# Patient Record
Sex: FEMALE | Race: WHITE | NOT HISPANIC OR LATINO | URBAN - METROPOLITAN AREA
[De-identification: names, ages, dates, MRNs, and addresses within clinical notes are randomized per-mention and may not be internally consistent; named-entity substitution may affect disease eponyms.]

---

## 2022-06-21 ENCOUNTER — INPATIENT (INPATIENT)
Facility: HOSPITAL | Age: 67
LOS: 2 days | Discharge: LEFT BEFORE DISCHARGE | DRG: 392 | End: 2022-06-24
Attending: INTERNAL MEDICINE | Admitting: INTERNAL MEDICINE
Payer: MEDICARE

## 2022-06-21 VITALS
TEMPERATURE: 98 F | WEIGHT: 134.92 LBS | RESPIRATION RATE: 20 BRPM | HEART RATE: 104 BPM | DIASTOLIC BLOOD PRESSURE: 72 MMHG | OXYGEN SATURATION: 98 % | HEIGHT: 63 IN | SYSTOLIC BLOOD PRESSURE: 156 MMHG

## 2022-06-21 DIAGNOSIS — Z29.9 ENCOUNTER FOR PROPHYLACTIC MEASURES, UNSPECIFIED: ICD-10-CM

## 2022-06-21 DIAGNOSIS — E83.42 HYPOMAGNESEMIA: ICD-10-CM

## 2022-06-21 DIAGNOSIS — K52.9 NONINFECTIVE GASTROENTERITIS AND COLITIS, UNSPECIFIED: ICD-10-CM

## 2022-06-21 DIAGNOSIS — E87.1 HYPO-OSMOLALITY AND HYPONATREMIA: ICD-10-CM

## 2022-06-21 DIAGNOSIS — I10 ESSENTIAL (PRIMARY) HYPERTENSION: ICD-10-CM

## 2022-06-21 DIAGNOSIS — N17.9 ACUTE KIDNEY FAILURE, UNSPECIFIED: ICD-10-CM

## 2022-06-21 DIAGNOSIS — Z98.890 OTHER SPECIFIED POSTPROCEDURAL STATES: Chronic | ICD-10-CM

## 2022-06-21 DIAGNOSIS — E87.6 HYPOKALEMIA: ICD-10-CM

## 2022-06-21 DIAGNOSIS — E78.5 HYPERLIPIDEMIA, UNSPECIFIED: ICD-10-CM

## 2022-06-21 LAB
ALBUMIN SERPL ELPH-MCNC: 2.7 G/DL — LOW (ref 3.3–5)
ALP SERPL-CCNC: 82 U/L — SIGNIFICANT CHANGE UP (ref 30–120)
ALT FLD-CCNC: 21 U/L DA — SIGNIFICANT CHANGE UP (ref 10–60)
ANION GAP SERPL CALC-SCNC: 10 MMOL/L — SIGNIFICANT CHANGE UP (ref 5–17)
ANION GAP SERPL CALC-SCNC: 9 MMOL/L — SIGNIFICANT CHANGE UP (ref 5–17)
APTT BLD: 24.7 SEC — LOW (ref 27.5–35.5)
AST SERPL-CCNC: 26 U/L — SIGNIFICANT CHANGE UP (ref 10–40)
BASOPHILS # BLD AUTO: 0.03 K/UL — SIGNIFICANT CHANGE UP (ref 0–0.2)
BASOPHILS NFR BLD AUTO: 0.3 % — SIGNIFICANT CHANGE UP (ref 0–2)
BILIRUB SERPL-MCNC: 0.5 MG/DL — SIGNIFICANT CHANGE UP (ref 0.2–1.2)
BUN SERPL-MCNC: 15 MG/DL — SIGNIFICANT CHANGE UP (ref 7–23)
BUN SERPL-MCNC: 17 MG/DL — SIGNIFICANT CHANGE UP (ref 7–23)
CALCIUM SERPL-MCNC: 8.2 MG/DL — LOW (ref 8.4–10.5)
CALCIUM SERPL-MCNC: 9 MG/DL — SIGNIFICANT CHANGE UP (ref 8.4–10.5)
CHLORIDE SERPL-SCNC: 86 MMOL/L — LOW (ref 96–108)
CHLORIDE SERPL-SCNC: 91 MMOL/L — LOW (ref 96–108)
CO2 SERPL-SCNC: 29 MMOL/L — SIGNIFICANT CHANGE UP (ref 22–31)
CO2 SERPL-SCNC: 32 MMOL/L — HIGH (ref 22–31)
CREAT SERPL-MCNC: 1.35 MG/DL — HIGH (ref 0.5–1.3)
CREAT SERPL-MCNC: 1.56 MG/DL — HIGH (ref 0.5–1.3)
EGFR: 37 ML/MIN/1.73M2 — LOW
EGFR: 44 ML/MIN/1.73M2 — LOW
EOSINOPHIL # BLD AUTO: 0.03 K/UL — SIGNIFICANT CHANGE UP (ref 0–0.5)
EOSINOPHIL NFR BLD AUTO: 0.3 % — SIGNIFICANT CHANGE UP (ref 0–6)
ERYTHROCYTE [SEDIMENTATION RATE] IN BLOOD: 107 MM/HR — HIGH (ref 0–20)
GLUCOSE SERPL-MCNC: 124 MG/DL — HIGH (ref 70–99)
GLUCOSE SERPL-MCNC: 162 MG/DL — HIGH (ref 70–99)
HCT VFR BLD CALC: 31.7 % — LOW (ref 34.5–45)
HGB BLD-MCNC: 10.7 G/DL — LOW (ref 11.5–15.5)
IMM GRANULOCYTES NFR BLD AUTO: 0.8 % — SIGNIFICANT CHANGE UP (ref 0–1.5)
INR BLD: 1.18 RATIO — HIGH (ref 0.88–1.16)
LYMPHOCYTES # BLD AUTO: 0.73 K/UL — LOW (ref 1–3.3)
LYMPHOCYTES # BLD AUTO: 8.1 % — LOW (ref 13–44)
MAGNESIUM SERPL-MCNC: 1.5 MG/DL — LOW (ref 1.6–2.6)
MCHC RBC-ENTMCNC: 29.1 PG — SIGNIFICANT CHANGE UP (ref 27–34)
MCHC RBC-ENTMCNC: 33.8 GM/DL — SIGNIFICANT CHANGE UP (ref 32–36)
MCV RBC AUTO: 86.1 FL — SIGNIFICANT CHANGE UP (ref 80–100)
MONOCYTES # BLD AUTO: 0.81 K/UL — SIGNIFICANT CHANGE UP (ref 0–0.9)
MONOCYTES NFR BLD AUTO: 9 % — SIGNIFICANT CHANGE UP (ref 2–14)
NEUTROPHILS # BLD AUTO: 7.38 K/UL — SIGNIFICANT CHANGE UP (ref 1.8–7.4)
NEUTROPHILS NFR BLD AUTO: 81.5 % — HIGH (ref 43–77)
NRBC # BLD: 0 /100 WBCS — SIGNIFICANT CHANGE UP (ref 0–0)
PHOSPHATE SERPL-MCNC: 2.8 MG/DL — SIGNIFICANT CHANGE UP (ref 2.5–4.5)
PLATELET # BLD AUTO: 409 K/UL — HIGH (ref 150–400)
POTASSIUM SERPL-MCNC: 2.5 MMOL/L — CRITICAL LOW (ref 3.5–5.3)
POTASSIUM SERPL-MCNC: 3 MMOL/L — LOW (ref 3.5–5.3)
POTASSIUM SERPL-SCNC: 2.5 MMOL/L — CRITICAL LOW (ref 3.5–5.3)
POTASSIUM SERPL-SCNC: 3 MMOL/L — LOW (ref 3.5–5.3)
PROT SERPL-MCNC: 7.7 G/DL — SIGNIFICANT CHANGE UP (ref 6–8.3)
PROTHROM AB SERPL-ACNC: 13.6 SEC — HIGH (ref 10.5–13.4)
RBC # BLD: 3.68 M/UL — LOW (ref 3.8–5.2)
RBC # FLD: 12.1 % — SIGNIFICANT CHANGE UP (ref 10.3–14.5)
SARS-COV-2 RNA SPEC QL NAA+PROBE: SIGNIFICANT CHANGE UP
SODIUM SERPL-SCNC: 127 MMOL/L — LOW (ref 135–145)
SODIUM SERPL-SCNC: 130 MMOL/L — LOW (ref 135–145)
WBC # BLD: 9.05 K/UL — SIGNIFICANT CHANGE UP (ref 3.8–10.5)
WBC # FLD AUTO: 9.05 K/UL — SIGNIFICANT CHANGE UP (ref 3.8–10.5)

## 2022-06-21 PROCEDURE — 99285 EMERGENCY DEPT VISIT HI MDM: CPT | Mod: FS

## 2022-06-21 PROCEDURE — 74176 CT ABD & PELVIS W/O CONTRAST: CPT | Mod: 26,MA

## 2022-06-21 PROCEDURE — 99223 1ST HOSP IP/OBS HIGH 75: CPT

## 2022-06-21 PROCEDURE — 93010 ELECTROCARDIOGRAM REPORT: CPT

## 2022-06-21 RX ORDER — ACETAMINOPHEN 500 MG
650 TABLET ORAL EVERY 6 HOURS
Refills: 0 | Status: DISCONTINUED | OUTPATIENT
Start: 2022-06-21 | End: 2022-06-24

## 2022-06-21 RX ORDER — SALIVA SUBSTITUTE COMB NO.11 351 MG
5 POWDER IN PACKET (EA) MUCOUS MEMBRANE
Refills: 0 | Status: DISCONTINUED | OUTPATIENT
Start: 2022-06-21 | End: 2022-06-24

## 2022-06-21 RX ORDER — HEPARIN SODIUM 5000 [USP'U]/ML
5000 INJECTION INTRAVENOUS; SUBCUTANEOUS EVERY 8 HOURS
Refills: 0 | Status: DISCONTINUED | OUTPATIENT
Start: 2022-06-21 | End: 2022-06-22

## 2022-06-21 RX ORDER — ONDANSETRON 8 MG/1
4 TABLET, FILM COATED ORAL EVERY 4 HOURS
Refills: 0 | Status: DISCONTINUED | OUTPATIENT
Start: 2022-06-21 | End: 2022-06-24

## 2022-06-21 RX ORDER — PIPERACILLIN AND TAZOBACTAM 4; .5 G/20ML; G/20ML
3.38 INJECTION, POWDER, LYOPHILIZED, FOR SOLUTION INTRAVENOUS ONCE
Refills: 0 | Status: COMPLETED | OUTPATIENT
Start: 2022-06-21 | End: 2022-06-21

## 2022-06-21 RX ORDER — SODIUM CHLORIDE 9 MG/ML
1000 INJECTION INTRAMUSCULAR; INTRAVENOUS; SUBCUTANEOUS ONCE
Refills: 0 | Status: COMPLETED | OUTPATIENT
Start: 2022-06-21 | End: 2022-06-21

## 2022-06-21 RX ORDER — DEXTROSE MONOHYDRATE, SODIUM CHLORIDE, AND POTASSIUM CHLORIDE 50; .745; 4.5 G/1000ML; G/1000ML; G/1000ML
1000 INJECTION, SOLUTION INTRAVENOUS
Refills: 0 | Status: DISCONTINUED | OUTPATIENT
Start: 2022-06-21 | End: 2022-06-24

## 2022-06-21 RX ORDER — PIPERACILLIN AND TAZOBACTAM 4; .5 G/20ML; G/20ML
3.38 INJECTION, POWDER, LYOPHILIZED, FOR SOLUTION INTRAVENOUS EVERY 8 HOURS
Refills: 0 | Status: DISCONTINUED | OUTPATIENT
Start: 2022-06-22 | End: 2022-06-24

## 2022-06-21 RX ORDER — POTASSIUM CHLORIDE 20 MEQ
10 PACKET (EA) ORAL
Refills: 0 | Status: COMPLETED | OUTPATIENT
Start: 2022-06-21 | End: 2022-06-21

## 2022-06-21 RX ORDER — OXYCODONE HYDROCHLORIDE 5 MG/1
5 TABLET ORAL EVERY 4 HOURS
Refills: 0 | Status: DISCONTINUED | OUTPATIENT
Start: 2022-06-21 | End: 2022-06-24

## 2022-06-21 RX ORDER — SODIUM CHLORIDE 9 MG/ML
1500 INJECTION, SOLUTION INTRAVENOUS ONCE
Refills: 0 | Status: COMPLETED | OUTPATIENT
Start: 2022-06-21 | End: 2022-06-21

## 2022-06-21 RX ORDER — MAGNESIUM SULFATE 500 MG/ML
2 VIAL (ML) INJECTION ONCE
Refills: 0 | Status: COMPLETED | OUTPATIENT
Start: 2022-06-21 | End: 2022-06-21

## 2022-06-21 RX ADMIN — Medication 5 MILLILITER(S): at 19:34

## 2022-06-21 RX ADMIN — PIPERACILLIN AND TAZOBACTAM 200 GRAM(S): 4; .5 INJECTION, POWDER, LYOPHILIZED, FOR SOLUTION INTRAVENOUS at 19:34

## 2022-06-21 RX ADMIN — Medication 100 MILLIEQUIVALENT(S): at 19:33

## 2022-06-21 RX ADMIN — Medication 5 MILLILITER(S): at 23:35

## 2022-06-21 RX ADMIN — Medication 100 MILLIEQUIVALENT(S): at 20:29

## 2022-06-21 RX ADMIN — SODIUM CHLORIDE 1000 MILLILITER(S): 9 INJECTION, SOLUTION INTRAVENOUS at 23:35

## 2022-06-21 RX ADMIN — SODIUM CHLORIDE 1000 MILLILITER(S): 9 INJECTION INTRAMUSCULAR; INTRAVENOUS; SUBCUTANEOUS at 19:34

## 2022-06-21 RX ADMIN — DEXTROSE MONOHYDRATE, SODIUM CHLORIDE, AND POTASSIUM CHLORIDE 75 MILLILITER(S): 50; .745; 4.5 INJECTION, SOLUTION INTRAVENOUS at 21:40

## 2022-06-21 RX ADMIN — Medication 100 MILLIEQUIVALENT(S): at 21:43

## 2022-06-21 RX ADMIN — Medication 25 GRAM(S): at 19:33

## 2022-06-21 NOTE — PATIENT PROFILE ADULT - FALL HARM RISK - UNIVERSAL INTERVENTIONS
Bed in lowest position, wheels locked, appropriate side rails in place/Call bell, personal items and telephone in reach/Instruct patient to call for assistance before getting out of bed or chair/Non-slip footwear when patient is out of bed/[place] to call system/Physically safe environment - no spills, clutter or unnecessary equipment/Purposeful Proactive Rounding/Room/bathroom lighting operational, light cord in reach

## 2022-06-21 NOTE — PATIENT PROFILE ADULT - FUNCTIONAL ASSESSMENT - BASIC MOBILITY ASSESSMENT TYPE
Otc Regimen: Panoxyl 4% creamy wash Detail Level: Simple Plan: Pt does not want any further treatment at this time as is desiring pregnancy. Admission

## 2022-06-21 NOTE — H&P ADULT - PROBLEM SELECTOR PLAN 4
moderate, asymptomatic, slow correction to avoid ODS, not to exceed 6-8 meq/L per 24h, closely monitor sodium level.

## 2022-06-21 NOTE — H&P ADULT - NSHPREVIEWOFSYSTEMS_GEN_ALL_CORE
- -    CONSTITUTIONAL: Low grade fever at home, no chills.  EYES: No eye pain, visual disturbances, or discharge.  ENMT:  No difficulty hearing, vertigo, sinus or throat pain, reports painful mouth ulcers.  NECK: No pain or stiffness.	  RESPIRATORY: No cough, wheezing, or hemoptysis; No shortness of breath.  CARDIOVASCULAR: No chest pain, palpitations, or dizziness, (+) leg swelling.  GASTROINTESTINAL: (+) abdominal pain, no nausea, vomiting, or hematemesis; (+) diarrhea No melena or hematochezia.  GENITOURINARY: No dysuria, frequency, hematuria, or incontinence.  NEUROLOGICAL: No headaches, focal muscle weakness, numbness, or tremors.  SKIN: No itching, burning or new rashes.  MUSCULOSKELETAL: No joint swelling or pain.  PSYCHIATRIC: No depression, anxiety, or agitation.  HEME/LYMPH: No easy bruising, bleeding gums, or nose bleed.  ALLERGY AND IMMUNOLOGIC: No hives or eczema.

## 2022-06-21 NOTE — H&P ADULT - PROBLEM SELECTOR PLAN 7
not on any medications for that, stated she tried 2 different stains & both gave her a bad reaction including skin rash, edema, and arthralgias, not willing to try any other medications even from other groups, she understands & agreed that she have an abnormal EKG & needs to see a cardiologist, and he can also take care of her cholesterol. not on any medications for that, stated she tried 2 different statins & both gave her a bad reaction including skin rash, edema, and arthralgias, not willing to try any other medications even from other groups, she understands & agreed that she have an abnormal EKG & needs to see a cardiologist, and he can also take care of her cholesterol.

## 2022-06-21 NOTE — ED PROVIDER NOTE - CARE PLAN
1 Principal Discharge DX:	Colitis  Secondary Diagnosis:	Hyponatremia  Secondary Diagnosis:	Hypokalemia

## 2022-06-21 NOTE — H&P ADULT - NSHPPHYSICALEXAM_GEN_ALL_CORE
-    Vital Signs Last 24 Hrs  T(C): 36.8 (21 Jun 2022 15:25), Max: 36.8 (21 Jun 2022 15:25)  T(F): 98.3 (21 Jun 2022 15:25), Max: 98.3 (21 Jun 2022 15:25)  HR: 104 (21 Jun 2022 15:25) (104 - 104)  BP: 156/72 (21 Jun 2022 15:25) (156/72 - 156/72)  BP(mean): --  RR: 20 (21 Jun 2022 15:25) (20 - 20)  SpO2: 98% (21 Jun 2022 15:25) (98% - 98%)        PHYSICAL EXAM:  		  GENERAL: NAD, well-groomed, well-developed.  HEAD:  Atraumatic, Norm cephalic.  EYES: PERRLA, conjunctiva clear.  ENMT: no nasal discharge, MMM.   NECK: Supple, No JVD.  NERVOUS SYSTEM:  Alert & oriented X3, neurologically intact grossly.  CHEST/LUNG: Decreased air entry allover equally B/L, no rales, rhonchi, or wheezing.  HEART: Normal S1 & S2, grade I/VI short BENITA best heard over 1st aortic area propagated to the apex, no extra sounds.  ABDOMEN: Soft, mildly tender LLQ, no guarding, non-distended; bowel sounds present, no palpable masses or organomegaly.  EXTREMITIES:  No clubbing, cyanosis, or edema.  VASCULAR: 2+ radial, DPA / PTA pulses B/L.  SKIN: (+) B/L lower extremity healed rash specially over both legs, (+) left sided suspicious face lesion (when asked she stated it was checked already, and no malignancy).  PSYCH: Tearful while talking about her health issues. -    Vital Signs Last 24 Hrs  T(C): 36.8 (21 Jun 2022 15:25), Max: 36.8 (21 Jun 2022 15:25)  T(F): 98.3 (21 Jun 2022 15:25), Max: 98.3 (21 Jun 2022 15:25)  HR: 104 (21 Jun 2022 15:25) (104 - 104)  BP: 156/72 (21 Jun 2022 15:25) (156/72 - 156/72)  BP(mean): --  RR: 20 (21 Jun 2022 15:25) (20 - 20)  SpO2: 98% (21 Jun 2022 15:25) (98% - 98%)        PHYSICAL EXAM:  		  GENERAL: NAD, well-groomed, well-developed.  HEAD:  Atraumatic, Norm cephalic.  EYES: PERRLA, conjunctiva clear.  ENMT: no nasal discharge, MMM, (+) mouth sores.   NECK: Supple, No JVD.  NERVOUS SYSTEM:  Alert & oriented X3, neurologically intact grossly.  CHEST/LUNG: Decreased air entry allover equally B/L, no rales, rhonchi, or wheezing.  HEART: Normal S1 & S2, grade I/VI short BENITA best heard over 1st aortic area propagated to the apex, no extra sounds.  ABDOMEN: Soft, mildly tender LLQ, no guarding, non-distended; bowel sounds present, no palpable masses or organomegaly.  EXTREMITIES:  No clubbing or cyanosis, (+) B/L soft pitting mild leg edema.  VASCULAR: 2+ radial, DPA / PTA pulses B/L.  SKIN: (+) B/L lower extremity healed rash specially over both legs, (+) left sided suspicious face lesion (when asked she stated it was checked already, and no malignancy).  PSYCH: Tearful while talking about her health issues.

## 2022-06-21 NOTE — H&P ADULT - PROBLEM SELECTOR PLAN 3
ML 2ry to GI loss over the past 3 weeks, supplement & monitor potassium level, telemetry monitoring.

## 2022-06-21 NOTE — H&P ADULT - NSICDXFAMILYHX_GEN_ALL_CORE_FT
FAMILY HISTORY:  Mother  Still living? Yes, Estimated age: Age Unknown  FH: colon cancer, Age at diagnosis: Age Unknown

## 2022-06-21 NOTE — H&P ADULT - HISTORY OF PRESENT ILLNESS
This is a 68 y/o F with PMH of HTN, and Dyslipidemia, who presented with 3 weeks history of diarrhea with abdominal pain, describes the diarrhea as pure mucous, starts everyday in the morning and continue for the whole day, no blood in stools, but only on the toilet paper, pain is crampy, diffuse allover the abdomen, max at the LLQ, no associated nausea or vomiting, but reports low grade fever, seen her doctor yesterday who recommended a CT, but she preferred to come to the ED. Patient has hemorrhoids for long years since she had her 1st child, never got a screening colonoscopy despite her strong family history for colon CA (mother).

## 2022-06-21 NOTE — ED PROVIDER NOTE - CLINICAL SUMMARY MEDICAL DECISION MAKING FREE TEXT BOX
Carine OLEA for ED attending, Dr. Rodrigues: 66 y/o F w/ PMHx of HLD, arthritis presents to ED c/o diarrhea 3 weeks, worsening over past week. Pt reports having 5-6 episodes a day. Denies fever, chills, n/v. Pt also c/o mouth sores and increased foot pain and hand pain x 2 weeks. Pt reports she is being treated for high cholesterol over the last 3 months but is no longer on any medication due to adverse reactions. Pt has appointment w/ Dr. Singh GI next week. Pt reports she has never had endoscopy or colonoscopy before. Physical exam: vital signs normal, afebrile and in no distress, heart and lungs normal, abd soft bowel sounds positive vague generalized tenderness no mass or hsm no rebound, extremities ankle swelling noted L worse than R. Impression is diarrhea and arthritis ?drug reaction vs GI infection, colitis may need rheumatologic workup. Plan labs, CT abd and may need GI evaluation.

## 2022-06-21 NOTE — H&P ADULT - PROBLEM SELECTOR PLAN 8
patient is at risk for VTE given also her admission diagnosis & B/L leg edema, started her on UFH 5000 units sub Q every 8h for DVT prophylaxis.

## 2022-06-21 NOTE — ED PROVIDER NOTE - NS ED ATTENDING STATEMENT MOD
This was a shared visit with the JUSTYNA. I reviewed and verified the documentation and independently performed the documented:

## 2022-06-21 NOTE — H&P ADULT - PROBLEM SELECTOR PLAN 5
supplemented earlier with 2 gm magnesium sulfate earlier, recheck magnesium level in am, on telemetry monitoring.

## 2022-06-21 NOTE — H&P ADULT - PROBLEM SELECTOR PLAN 6
controlled, patient stated she doesn't remember her medications names, will provide her home medication list later, monitor for now.

## 2022-06-21 NOTE — H&P ADULT - PROBLEM SELECTOR PLAN 1
Possibly infectious colitis versus inflammatory, clear liquid diet, pain control, IVF hydration, started on Zosyn by ED team, will continue Q 8h, stool culture, stool PCR panel, stool for ova & parasites, GI consult with Dr. Nassar was called.

## 2022-06-21 NOTE — H&P ADULT - NSHPLABSRESULTS_GEN_ALL_CORE
-                            10.7   9.05  )-----------( 409      ( 21 Jun 2022 16:38 )             31.7              06-21      130<L>  |  91<L>  |  15  ----------------------------<  162<H>  3.0<L>   |  29  |  1.35<H>      Ca    8.2<L>      21 Jun 2022 20:18  Phos  2.8     06-21  Mg     1.5     06-21    TPro  7.7  /  Alb  2.7<L>  /  TBili  0.5  /  DBili  x   /  AST  26  /  ALT  21  /  AlkPhos  82  06-21            PT/INR - ( 21 Jun 2022 20:18 )   PT: 13.6 sec;   INR: 1.18 ratio    PTT - ( 21 Jun 2022 20:18 )  PTT:24.7 sec      COVID-19 PCR: NotDetec (21 Jun 2022 18:09)      Magnesium, Serum in AM (06.21.22 @ 16:38)   Magnesium, Serum: 1.5 mg/dL   Phosphorus Level, Serum (06.21.22 @ 16:38)   Phosphorus Level, Serum: 2.8 mg/dL       `CT ABDOMEN AND PELVIS                        PROCEDURE DATE:  06/21/2022    INTERPRETATION:  CLINICAL INFORMATION: Diarrhea and left lower quadrant   pain.  COMPARISON: None.  CONTRAST/COMPLICATIONS:  IV Contrast: NONE  Oral Contrast: NONE  IMPRESSION:  Limited noncontrast study.  Moderate to severe colitis of the left colon and sigmoid colon. Prominent   adjacent adenopathy is likely reactive in nature. No abscess or free air.   Follow to resolution with repeat CT abdomen pelvis in 10-14 days.   Consider colonoscopy once acute symptoms have resolved.  Few partially imaged clustered nodules of the right middle lobe, images   1-4 of series 3. These findings may be infectious or inflammatory.   Correlate with chest CT.  Common bile duct is enlarged for patient age, measuring approximately 1   cm. Correlate with LFTs.  Personally reviewed by me.        EKG:    As per my review shows poor baseline, SR at 90/min with 1st degree AV block, normal ND & QTc intervals, normal QRS voltage, duration, and axis (+60), with normal transition, inferior ST-T abnormality.      -

## 2022-06-21 NOTE — ED ADULT NURSE NOTE - NSIMPLEMENTINTERV_GEN_ALL_ED
Implemented All Universal Safety Interventions:  [place] to call system. Call bell, personal items and telephone within reach. Instruct patient to call for assistance. Room bathroom lighting operational. Non-slip footwear when patient is off stretcher. Physically safe environment: no spills, clutter or unnecessary equipment. Stretcher in lowest position, wheels locked, appropriate side rails in place.

## 2022-06-21 NOTE — H&P ADULT - NSHPSOCIALHISTORY_GEN_ALL_CORE
-    , former smoker, stated she was a "heavy smoker", quit 10 years ago, social ETOH, no drug abuse.

## 2022-06-21 NOTE — ED PROVIDER NOTE - OBJECTIVE STATEMENT
Pt is a 66 yo female with pmhx of HTN HLD c/o of diarrhea for 3 weeks in morning and about 6 episodes per day for past week denies any nv fever chills chest pain sob urinary symptoms. Pt also having b/l ankle pain and swelling for weeks and pcp thinks hld medication related so switched medications. Pt also c/o mouth sores and increased foot pain and hand pain x 2 weeks. Pt reports she is being treated for high cholesterol over the last 3 months but is no longer on any medication due to adverse reactions. Pt has appointment w/ Dr. Samantha GOLD next week. Pt reports she has never had endoscopy or colonoscopy before.    pcp Leslee

## 2022-06-22 DIAGNOSIS — M54.2 CERVICALGIA: ICD-10-CM

## 2022-06-22 DIAGNOSIS — R91.1 SOLITARY PULMONARY NODULE: ICD-10-CM

## 2022-06-22 LAB
ALBUMIN SERPL ELPH-MCNC: 2 G/DL — LOW (ref 3.3–5)
ALP SERPL-CCNC: 57 U/L — SIGNIFICANT CHANGE UP (ref 30–120)
ALT FLD-CCNC: 16 U/L DA — SIGNIFICANT CHANGE UP (ref 10–60)
ANA TITR SER: NEGATIVE — SIGNIFICANT CHANGE UP
ANION GAP SERPL CALC-SCNC: 8 MMOL/L — SIGNIFICANT CHANGE UP (ref 5–17)
APPEARANCE UR: CLEAR — SIGNIFICANT CHANGE UP
AST SERPL-CCNC: 18 U/L — SIGNIFICANT CHANGE UP (ref 10–40)
BACTERIA # UR AUTO: ABNORMAL
BASOPHILS # BLD AUTO: 0.04 K/UL — SIGNIFICANT CHANGE UP (ref 0–0.2)
BASOPHILS NFR BLD AUTO: 0.4 % — SIGNIFICANT CHANGE UP (ref 0–2)
BILIRUB SERPL-MCNC: 0.4 MG/DL — SIGNIFICANT CHANGE UP (ref 0.2–1.2)
BILIRUB UR-MCNC: NEGATIVE — SIGNIFICANT CHANGE UP
BUN SERPL-MCNC: 12 MG/DL — SIGNIFICANT CHANGE UP (ref 7–23)
CALCIUM SERPL-MCNC: 8.2 MG/DL — LOW (ref 8.4–10.5)
CEA SERPL-MCNC: 3.8 NG/ML — SIGNIFICANT CHANGE UP (ref 0–3.8)
CHLORIDE SERPL-SCNC: 97 MMOL/L — SIGNIFICANT CHANGE UP (ref 96–108)
CO2 SERPL-SCNC: 27 MMOL/L — SIGNIFICANT CHANGE UP (ref 22–31)
COLOR SPEC: YELLOW — SIGNIFICANT CHANGE UP
CREAT SERPL-MCNC: 1.43 MG/DL — HIGH (ref 0.5–1.3)
CRP SERPL-MCNC: 249 MG/L — HIGH
CRP SERPL-MCNC: 323 MG/L — HIGH
DIFF PNL FLD: NEGATIVE — SIGNIFICANT CHANGE UP
EGFR: 41 ML/MIN/1.73M2 — LOW
EOSINOPHIL # BLD AUTO: 0.03 K/UL — SIGNIFICANT CHANGE UP (ref 0–0.5)
EOSINOPHIL NFR BLD AUTO: 0.3 % — SIGNIFICANT CHANGE UP (ref 0–6)
EPI CELLS # UR: SIGNIFICANT CHANGE UP
GLUCOSE SERPL-MCNC: 162 MG/DL — HIGH (ref 70–99)
GLUCOSE UR QL: NEGATIVE MG/DL — SIGNIFICANT CHANGE UP
HCT VFR BLD CALC: 27.2 % — LOW (ref 34.5–45)
HCV AB S/CO SERPL IA: 0.08 S/CO — SIGNIFICANT CHANGE UP (ref 0–0.99)
HCV AB SERPL-IMP: SIGNIFICANT CHANGE UP
HGB BLD-MCNC: 8.9 G/DL — LOW (ref 11.5–15.5)
IMM GRANULOCYTES NFR BLD AUTO: 0.6 % — SIGNIFICANT CHANGE UP (ref 0–1.5)
KETONES UR-MCNC: NEGATIVE — SIGNIFICANT CHANGE UP
LEUKOCYTE ESTERASE UR-ACNC: NEGATIVE — SIGNIFICANT CHANGE UP
LYMPHOCYTES # BLD AUTO: 0.75 K/UL — LOW (ref 1–3.3)
LYMPHOCYTES # BLD AUTO: 7.7 % — LOW (ref 13–44)
MAGNESIUM SERPL-MCNC: 1.9 MG/DL — SIGNIFICANT CHANGE UP (ref 1.6–2.6)
MCHC RBC-ENTMCNC: 28.4 PG — SIGNIFICANT CHANGE UP (ref 27–34)
MCHC RBC-ENTMCNC: 32.7 GM/DL — SIGNIFICANT CHANGE UP (ref 32–36)
MCV RBC AUTO: 86.9 FL — SIGNIFICANT CHANGE UP (ref 80–100)
MONOCYTES # BLD AUTO: 1.17 K/UL — HIGH (ref 0–0.9)
MONOCYTES NFR BLD AUTO: 12 % — SIGNIFICANT CHANGE UP (ref 2–14)
NEUTROPHILS # BLD AUTO: 7.71 K/UL — HIGH (ref 1.8–7.4)
NEUTROPHILS NFR BLD AUTO: 79 % — HIGH (ref 43–77)
NITRITE UR-MCNC: NEGATIVE — SIGNIFICANT CHANGE UP
NRBC # BLD: 0 /100 WBCS — SIGNIFICANT CHANGE UP (ref 0–0)
OSMOLALITY UR: 353 MOSM/KG — SIGNIFICANT CHANGE UP (ref 50–1200)
PH UR: 6.5 — SIGNIFICANT CHANGE UP (ref 5–8)
PHOSPHATE SERPL-MCNC: 3 MG/DL — SIGNIFICANT CHANGE UP (ref 2.5–4.5)
PLATELET # BLD AUTO: 340 K/UL — SIGNIFICANT CHANGE UP (ref 150–400)
POTASSIUM SERPL-MCNC: 2.7 MMOL/L — CRITICAL LOW (ref 3.5–5.3)
POTASSIUM SERPL-SCNC: 2.7 MMOL/L — CRITICAL LOW (ref 3.5–5.3)
PROCALCITONIN SERPL-MCNC: 0.3 NG/ML — HIGH (ref 0.02–0.1)
PROT SERPL-MCNC: 5.7 G/DL — LOW (ref 6–8.3)
PROT UR-MCNC: 15 MG/DL
RBC # BLD: 3.13 M/UL — LOW (ref 3.8–5.2)
RBC # FLD: 12.4 % — SIGNIFICANT CHANGE UP (ref 10.3–14.5)
RHEUMATOID FACT SERPL-ACNC: 19 IU/ML — HIGH (ref 0–13)
SODIUM SERPL-SCNC: 132 MMOL/L — LOW (ref 135–145)
SODIUM UR-SCNC: 32 MMOL/L — SIGNIFICANT CHANGE UP
SP GR SPEC: 1.01 — SIGNIFICANT CHANGE UP (ref 1.01–1.02)
TSH SERPL-MCNC: 2.35 UIU/ML — SIGNIFICANT CHANGE UP (ref 0.27–4.2)
UROBILINOGEN FLD QL: NEGATIVE MG/DL — SIGNIFICANT CHANGE UP
WBC # BLD: 9.76 K/UL — SIGNIFICANT CHANGE UP (ref 3.8–10.5)
WBC # FLD AUTO: 9.76 K/UL — SIGNIFICANT CHANGE UP (ref 3.8–10.5)
WBC UR QL: SIGNIFICANT CHANGE UP

## 2022-06-22 PROCEDURE — 99233 SBSQ HOSP IP/OBS HIGH 50: CPT

## 2022-06-22 RX ORDER — POTASSIUM CHLORIDE 20 MEQ
10 PACKET (EA) ORAL
Refills: 0 | Status: COMPLETED | OUTPATIENT
Start: 2022-06-22 | End: 2022-06-22

## 2022-06-22 RX ORDER — MAGNESIUM SULFATE 500 MG/ML
1 VIAL (ML) INJECTION ONCE
Refills: 0 | Status: COMPLETED | OUTPATIENT
Start: 2022-06-22 | End: 2022-06-22

## 2022-06-22 RX ORDER — ROSUVASTATIN CALCIUM 5 MG/1
0 TABLET ORAL
Qty: 0 | Refills: 0 | DISCHARGE

## 2022-06-22 RX ORDER — OXYCODONE HYDROCHLORIDE 5 MG/1
15 TABLET ORAL THREE TIMES A DAY
Refills: 0 | Status: DISCONTINUED | OUTPATIENT
Start: 2022-06-22 | End: 2022-06-24

## 2022-06-22 RX ORDER — HEPARIN SODIUM 5000 [USP'U]/ML
5000 INJECTION INTRAVENOUS; SUBCUTANEOUS EVERY 12 HOURS
Refills: 0 | Status: DISCONTINUED | OUTPATIENT
Start: 2022-06-22 | End: 2022-06-24

## 2022-06-22 RX ORDER — ACETAMINOPHEN 500 MG
650 TABLET ORAL EVERY 6 HOURS
Refills: 0 | Status: DISCONTINUED | OUTPATIENT
Start: 2022-06-22 | End: 2022-06-24

## 2022-06-22 RX ADMIN — Medication 650 MILLIGRAM(S): at 02:25

## 2022-06-22 RX ADMIN — Medication 100 MILLIEQUIVALENT(S): at 10:45

## 2022-06-22 RX ADMIN — Medication 5 MILLILITER(S): at 18:03

## 2022-06-22 RX ADMIN — HEPARIN SODIUM 5000 UNIT(S): 5000 INJECTION INTRAVENOUS; SUBCUTANEOUS at 18:02

## 2022-06-22 RX ADMIN — DEXTROSE MONOHYDRATE, SODIUM CHLORIDE, AND POTASSIUM CHLORIDE 75 MILLILITER(S): 50; .745; 4.5 INJECTION, SOLUTION INTRAVENOUS at 02:19

## 2022-06-22 RX ADMIN — Medication 100 MILLIEQUIVALENT(S): at 09:56

## 2022-06-22 RX ADMIN — Medication 100 MILLIEQUIVALENT(S): at 09:01

## 2022-06-22 RX ADMIN — DEXTROSE MONOHYDRATE, SODIUM CHLORIDE, AND POTASSIUM CHLORIDE 75 MILLILITER(S): 50; .745; 4.5 INJECTION, SOLUTION INTRAVENOUS at 12:50

## 2022-06-22 RX ADMIN — Medication 100 GRAM(S): at 10:45

## 2022-06-22 RX ADMIN — Medication 5 MILLILITER(S): at 05:49

## 2022-06-22 RX ADMIN — PIPERACILLIN AND TAZOBACTAM 25 GRAM(S): 4; .5 INJECTION, POWDER, LYOPHILIZED, FOR SOLUTION INTRAVENOUS at 18:03

## 2022-06-22 RX ADMIN — HEPARIN SODIUM 5000 UNIT(S): 5000 INJECTION INTRAVENOUS; SUBCUTANEOUS at 05:55

## 2022-06-22 RX ADMIN — Medication 5 MILLILITER(S): at 11:29

## 2022-06-22 RX ADMIN — PIPERACILLIN AND TAZOBACTAM 25 GRAM(S): 4; .5 INJECTION, POWDER, LYOPHILIZED, FOR SOLUTION INTRAVENOUS at 03:55

## 2022-06-22 NOTE — CONSULT NOTE ADULT - SUBJECTIVE AND OBJECTIVE BOX
Date/Time Patient Seen:  		  Referring MD:   Data Reviewed	       Patient is a 67y old  Female who presents with a chief complaint of Abdominal pain & diarrhea. (21 Jun 2022 21:17)      Subjective/HPI  in bed  seen and examined  vs noted  labs reviewed  imaging reviewed  alert  verbal  oriented  social drinker  non smoker  lives at home    has 2 children  retired  ct abd noted  H and P reviewed  ER provider note reviewed    History of Present Illness:  Reason for Admission: Abdominal pain & diarrhea.  History of Present Illness:   This is a 66 y/o F with PMH of HTN, and Dyslipidemia, who presented with 3 weeks history of diarrhea with abdominal pain, describes the diarrhea as pure mucous, starts everyday in the morning and continue for the whole day, no blood in stools, but only on the toilet paper, pain is crampy, diffuse allover the abdomen, max at the LLQ, no associated nausea or vomiting, but reports low grade fever, seen her doctor yesterday who recommended a CT, but she preferred to come to the ED. Patient has hemorrhoids for long years since she had her 1st child, never got a screening colonoscopy despite her strong family history for colon CA (mother).       FAMILY HISTORY:  Mother  Still living? Yes, Estimated age: Age Unknown  FH: colon cancer, Age at diagnosis: Age Unknown.     Social History:  Social History (marital status, living situation, occupation, tobacco use, alcohol and drug use, and sexual history): -    , former smoker, stated she was a "heavy smoker", quit 10 years ago, social ETOH, no drug abuse.     Tobacco Screening:  · Core Measure Site	No  · Has the patient used tobacco in the past 30 days?	No    Risk Assessment:    Present on Admission:  Deep Venous Thrombosis	no  Pulmonary Embolus	no  Urinary Catheter	no  Central Venous Catheter/PICC Line	no  Surgical Site Incision	no  Pressure Ulcer(s)	no     Heart Failure:  Does this patient have a history of or has been diagnosed with heart failure? no.     PAST MEDICAL & SURGICAL HISTORY:  HTN (hypertension)    Dyslipidemia    H/O cervical spine surgery          Medication list         MEDICATIONS  (STANDING):  Biotene Dry Mouth Oral Rinse 5 milliLiter(s) Swish and Spit four times a day  dextrose 5% + sodium chloride 0.9% with potassium chloride 20 mEq/L 1000 milliLiter(s) (75 mL/Hr) IV Continuous <Continuous>  heparin   Injectable 5000 Unit(s) SubCutaneous every 8 hours  piperacillin/tazobactam IVPB.. 3.375 Gram(s) IV Intermittent every 8 hours    MEDICATIONS  (PRN):  acetaminophen     Tablet .. 650 milliGRAM(s) Oral every 6 hours PRN Mild Pain (1 - 3)  acetaminophen     Tablet .. 650 milliGRAM(s) Oral every 6 hours PRN Temp greater or equal to 38C (100.4F)  ondansetron Injectable 4 milliGRAM(s) IV Push every 4 hours PRN Nausea and/or Vomiting  oxyCODONE    IR 5 milliGRAM(s) Oral every 4 hours PRN Moderate Pain (4 - 6)         Vitals log        ICU Vital Signs Last 24 Hrs  T(C): 36.8 (22 Jun 2022 07:42), Max: 38.7 (22 Jun 2022 01:45)  T(F): 98.3 (22 Jun 2022 07:42), Max: 101.7 (22 Jun 2022 01:45)  HR: 74 (22 Jun 2022 07:42) (74 - 104)  BP: 100/62 (22 Jun 2022 07:42) (100/62 - 156/72)  BP(mean): --  ABP: --  ABP(mean): --  RR: 16 (22 Jun 2022 07:42) (16 - 20)  SpO2: 96% (22 Jun 2022 07:42) (96% - 100%)           Input and Output:  I&O's Detail      Lab Data                        8.9    9.76  )-----------( 340      ( 22 Jun 2022 06:00 )             27.2     06-21    130<L>  |  91<L>  |  15  ----------------------------<  162<H>  3.0<L>   |  29  |  1.35<H>    Ca    8.2<L>      21 Jun 2022 20:18  Phos  2.8     06-21  Mg     1.5     06-21    TPro  7.7  /  Alb  2.7<L>  /  TBili  0.5  /  DBili  x   /  AST  26  /  ALT  21  /  AlkPhos  82  06-21            Review of Systems	  pain    Objective     Physical Examination    heart s1s2  lung dec BS  abd soft  head nc  verbal  alert  cn grossly int  moves all extr      Pertinent Lab findings & Imaging      Narayan:  NO   Adequate UO     I&O's Detail           Discussed with:     Cultures:	        Radiology      ACC: [id number] EXAM:  CT ABDOMEN AND PELVIS                          PROCEDURE DATE:  06/21/2022          INTERPRETATION:  CLINICAL INFORMATION: Diarrhea and left lower quadrant   pain.    COMPARISON: None.    CONTRAST/COMPLICATIONS:  IV Contrast: NONE  Oral Contrast: NONE  Complications: None reported at time of study completion    PROCEDURE:  CT of the Abdomen and Pelvis was performed.  Sagittal and coronal reformats were performed.    FINDINGS:    LOWER CHEST: Few partially imaged clustered nodules of the right middle   lobe, images 1-4 of series 3. These findings may be infectious or   inflammatory. Correlate with chest CT. No visualized pleural effusion.    Solid organ evaluation limited due to noncontrast technique.    LIVER: Measures 17.5 cm craniocaudal dimension  BILE DUCTS: No intrahepatic ductal dilatation. Common bile duct is   enlarged for patient age, measuring approximately 1 cm. Correlate with   LFTs.  GALLBLADDER: Contracted  SPLEEN: Spleen size within normal limits  PANCREAS: No acute peripancreatic inflammation  ADRENALS: Unremarkable  KIDNEYS/URETERS: No hydronephrosis    BLADDER: Underdistended  REPRODUCTIVE ORGANS: Myomatous uterus, suboptimally characterized on CT.    BOWEL: Stomach is underdistended. No small bowel distention. The appendix   is nondistended and noninflamed. There is long segment moderate to severe   mural thickening of the left colon and sigmoid colon concerning for   colitis. Adjacent reactive adenopathy is noted. Colonic diverticulosis is   also noted without clear evidence of focal diverticulitis.  PERITONEUM: Trace fluid. No loculated fluid collection or free air.  VESSELS: No aneurysm of the abdominal aorta. Aortoiliac atheromatous   changes.  RETROPERITONEUM/LYMPH NODES: Prominent adenopathy along the course of the   inflamed colon is likely reactive in nature.  ABDOMINAL WALL: Tiny fat-containing umbilical hernia.  BONES: Degenerative changes.    IMPRESSION:    Limited noncontrast study.    Moderate to severe colitis of the left colon and sigmoid colon. Prominent   adjacent adenopathy is likely reactive in nature. No abscess or free air.   Follow to resolution with repeat CT abdomen pelvis in 10-14 days.   Consider colonoscopy once acute symptoms have resolved.    Few partially imaged clustered nodules of the right middle lobe, images   1-4 of series 3. These findings may be infectious or inflammatory.   Correlate with chest CT.    Common bile duct is enlarged for patient age, measuring approximately 1   cm. Correlate with LFTs.    --- End of Report ---            SHERON RODRIGUEZ M.D., ATTENDING RADIOLOGIST  This document has been electronically signed. Jun 21 2022  4:45PM

## 2022-06-22 NOTE — CONSULT NOTE ADULT - SUBJECTIVE AND OBJECTIVE BOX
Patient is a 67y old  Female who presents with a chief complaint of Abdominal pain & diarrhea. (2022 07:57)    HPI:  This is a 68 y/o F with PMH of HTN, and Dyslipidemia, who presented with 3 weeks history of diarrhea with abdominal pain, describes the diarrhea as pure mucous, starts everyday in the morning and continue for the whole day, no blood in stools, but only on the toilet paper, pain is crampy, diffuse allover the abdomen, max at the LLQ, no associated nausea or vomiting, but reports low grade fever, seen her doctor yesterday who recommended a CT, but she preferred to come to the ED. Patient has hemorrhoids for long years since she had her 1st child, never got a screening colonoscopy despite her strong family history for colon CA (mother).  (2022 21:17)      PAST MEDICAL HISTORY:  HTN (hypertension)    Dyslipidemia        PAST SURGICAL HISTORY:  H/O cervical spine surgery        FAMILY HISTORY:  FH: colon cancer (Mother)        SOCIAL HISTORY:    Allergies    No Known Allergies    Intolerances      Home Medications:    MEDICATIONS  (STANDING):  Biotene Dry Mouth Oral Rinse 5 milliLiter(s) Swish and Spit four times a day  dextrose 5% + sodium chloride 0.9% with potassium chloride 20 mEq/L 1000 milliLiter(s) (75 mL/Hr) IV Continuous <Continuous>  heparin   Injectable 5000 Unit(s) SubCutaneous every 8 hours  magnesium sulfate  IVPB 1 Gram(s) IV Intermittent once  piperacillin/tazobactam IVPB.. 3.375 Gram(s) IV Intermittent every 8 hours  potassium chloride  10 mEq/100 mL IVPB 10 milliEquivalent(s) IV Intermittent every 1 hour    MEDICATIONS  (PRN):  acetaminophen     Tablet .. 650 milliGRAM(s) Oral every 6 hours PRN Mild Pain (1 - 3)  acetaminophen     Tablet .. 650 milliGRAM(s) Oral every 6 hours PRN Temp greater or equal to 38C (100.4F)  ondansetron Injectable 4 milliGRAM(s) IV Push every 4 hours PRN Nausea and/or Vomiting  oxyCODONE    IR 5 milliGRAM(s) Oral every 4 hours PRN Moderate Pain (4 - 6)      REVIEW OF SYSTEMS:  General:   Respiratory: No cough, SOB  Cardiovascular: No CP or Palpitations	  Gastrointestinal: No nausea, Vomiting. No diarrhea  Genitourinary: No urinary complaints	  Musculoskeletal: No leg swelling, No new rash or lesions	  Neurological: 	  all other systems negative    T(F): 98.3 (22 @ 07:42), Max: 101.7 (22 @ 01:45)  HR: 74 (22 @ 07:42) (74 - 104)  BP: 100/62 (22 @ 07:42) (100/62 - 156/72)  RR: 16 (22 @ 07:42) (16 - 20)  SpO2: 96% (22 @ 07:42) (96% - 100%)  Wt(kg): --    PHYSICAL EXAM:  General: NAD  Respiratory: b/l air entry  Cardiovascular: S1 S2  Gastrointestinal: soft  Extremities: edema            132<L>  |  97  |  12  ----------------------------<  162<H>  2.7<LL>   |  27  |  1.43<H>    Ca    8.2<L>      2022 06:00  Phos  3.0       Mg     1.9         TPro  5.7<L>  /  Alb  2.0<L>  /  TBili  0.4  /  DBili  x   /  AST  18  /  ALT  16  /  AlkPhos  57                            8.9    9.76  )-----------( 340      ( 2022 06:00 )             27.2       Hemoglobin: 8.9 g/dL ( @ 06:00)  Hematocrit: 27.2 % ( @ 06:00)  Blood Urea Nitrogen, Serum: 12 mg/dL ( @ 06:00)  Potassium, Serum: 2.7 mmol/L ( @ 06:00)      Creatinine, Serum: 1.43 ( @ 06:00)  Creatinine, Serum: 1.35 ( @ 20:18)  Creatinine, Serum: 1.56 ( @ 16:38)      Urinalysis Basic - ( 2022 02:38 )    Color: Yellow / Appearance: Clear / S.010 / pH: x  Gluc: x / Ketone: Negative  / Bili: Negative / Urobili: Negative mg/dL   Blood: x / Protein: 15 mg/dL / Nitrite: Negative   Leuk Esterase: Negative / RBC: x / WBC 0-2   Sq Epi: x / Non Sq Epi: Occasional / Bacteria: Occasional      LIVER FUNCTIONS - ( 2022 06:00 )  Alb: 2.0 g/dL / Pro: 5.7 g/dL / ALK PHOS: 57 U/L / ALT: 16 U/L DA / AST: 18 U/L / GGT: x                       I&O's Detail         Patient is a 67y old  Female who presents with a chief complaint of Abdominal pain & diarrhea. (2022 07:57)    HPI:  This is a 66 y/o F with PMH of HTN, and Dyslipidemia, who presented with 3 weeks history of diarrhea with abdominal pain, describes the diarrhea as pure mucous, starts everyday in the morning and continue for the whole day, no blood in stools, but only on the toilet paper, pain is crampy, diffuse allover the abdomen, max at the LLQ, no associated nausea or vomiting, but reports low grade fever, seen her doctor yesterday who recommended a CT, but she preferred to come to the ED. Patient has hemorrhoids for long years since she had her 1st child, never got a screening colonoscopy despite her strong family history for colon CA (mother).  (2022 21:17)    Renal consult called for hypokalemia. History obtained from chart and patient.       PAST MEDICAL HISTORY:  HTN (hypertension)    Dyslipidemia        PAST SURGICAL HISTORY:  H/O cervical spine surgery        FAMILY HISTORY:  FH: colon cancer (Mother)        SOCIAL HISTORY: No smoking or alcohol use     Allergies    No Known Allergies    Intolerances      Home Medications:    MEDICATIONS  (STANDING):  Biotene Dry Mouth Oral Rinse 5 milliLiter(s) Swish and Spit four times a day  dextrose 5% + sodium chloride 0.9% with potassium chloride 20 mEq/L 1000 milliLiter(s) (75 mL/Hr) IV Continuous <Continuous>  heparin   Injectable 5000 Unit(s) SubCutaneous every 8 hours  magnesium sulfate  IVPB 1 Gram(s) IV Intermittent once  piperacillin/tazobactam IVPB.. 3.375 Gram(s) IV Intermittent every 8 hours  potassium chloride  10 mEq/100 mL IVPB 10 milliEquivalent(s) IV Intermittent every 1 hour    MEDICATIONS  (PRN):  acetaminophen     Tablet .. 650 milliGRAM(s) Oral every 6 hours PRN Mild Pain (1 - 3)  acetaminophen     Tablet .. 650 milliGRAM(s) Oral every 6 hours PRN Temp greater or equal to 38C (100.4F)  ondansetron Injectable 4 milliGRAM(s) IV Push every 4 hours PRN Nausea and/or Vomiting  oxyCODONE    IR 5 milliGRAM(s) Oral every 4 hours PRN Moderate Pain (4 - 6)      REVIEW OF SYSTEMS:  General: no distress  Respiratory: No cough, SOB  Cardiovascular: No CP or Palpitations	  Gastrointestinal: + diarrhea  Genitourinary: No urinary complaints	  Musculoskeletal: No leg swelling, No new rash or lesions		  all other systems negative    T(F): 98.3 (22 @ 07:42), Max: 101.7 (22 @ 01:45)  HR: 74 (22 @ 07:42) (74 - 104)  BP: 100/62 (22 @ 07:42) (100/62 - 156/72)  RR: 16 (22 @ 07:42) (16 - 20)  SpO2: 96% (22 @ 07:42) (96% - 100%)  Wt(kg): --    PHYSICAL EXAM:  General: NAD  Respiratory: b/l air entry  Cardiovascular: S1 S2  Gastrointestinal: soft  Extremities: no edema            132<L>  |  97  |  12  ----------------------------<  162<H>  2.7<LL>   |  27  |  1.43<H>    Ca    8.2<L>      2022 06:00  Phos  3.0       Mg     1.9         TPro  5.7<L>  /  Alb  2.0<L>  /  TBili  0.4  /  DBili  x   /  AST  18  /  ALT  16  /  AlkPhos  57                            8.9    9.76  )-----------( 340      ( 2022 06:00 )             27.2       Hemoglobin: 8.9 g/dL ( @ 06:00)  Hematocrit: 27.2 % ( @ 06:00)  Blood Urea Nitrogen, Serum: 12 mg/dL ( @ 06:00)  Potassium, Serum: 2.7 mmol/L ( @ 06:00)      Creatinine, Serum: 1.43 ( @ 06:00)  Creatinine, Serum: 1.35 ( @ 20:18)  Creatinine, Serum: 1.56 ( @ 16:38)      Urinalysis Basic - ( 2022 02:38 )    Color: Yellow / Appearance: Clear / S.010 / pH: x  Gluc: x / Ketone: Negative  / Bili: Negative / Urobili: Negative mg/dL   Blood: x / Protein: 15 mg/dL / Nitrite: Negative   Leuk Esterase: Negative / RBC: x / WBC 0-2   Sq Epi: x / Non Sq Epi: Occasional / Bacteria: Occasional      LIVER FUNCTIONS - ( 2022 06:00 )  Alb: 2.0 g/dL / Pro: 5.7 g/dL / ALK PHOS: 57 U/L / ALT: 16 U/L DA / AST: 18 U/L / GGT: x             < from: CT Abdomen and Pelvis No Cont (22 @ 16:25) >    ACC: [id number] EXAM:  CT ABDOMEN AND PELVIS                          PROCEDURE DATE:  2022          INTERPRETATION:  CLINICAL INFORMATION: Diarrhea and left lower quadrant   pain.    COMPARISON: None.    CONTRAST/COMPLICATIONS:  IV Contrast: NONE  Oral Contrast: NONE  Complications: None reported at time of study completion    PROCEDURE:  CT of the Abdomen and Pelvis was performed.  Sagittal and coronal reformats were performed.    FINDINGS:    LOWER CHEST: Few partially imaged clustered nodules of the right middle   lobe, images 1-4 of series 3. These findings may be infectious or   inflammatory. Correlate with chest CT. No visualized pleural effusion.    Solid organ evaluation limited due to noncontrast technique.    LIVER: Measures 17.5 cm craniocaudal dimension  BILE DUCTS: No intrahepatic ductal dilatation. Common bile duct is   enlarged for patient age, measuring approximately 1 cm. Correlate with   LFTs.  GALLBLADDER: Contracted  SPLEEN: Spleen size within normal limits  PANCREAS: No acute peripancreatic inflammation  ADRENALS: Unremarkable  KIDNEYS/URETERS: No hydronephrosis    BLADDER: Underdistended  REPRODUCTIVE ORGANS: Myomatous uterus, suboptimally characterized on CT.    BOWEL: Stomach is underdistended. No small bowel distention. The appendix   is nondistended and noninflamed. There is long segment moderate to severe   mural thickening of the left colon and sigmoid colon concerning for   colitis. Adjacent reactive adenopathy is noted. Colonic diverticulosis is   also noted without clear evidence of focal diverticulitis.  PERITONEUM: Trace fluid. No loculated fluid collection or free air.  VESSELS: No aneurysm of the abdominal aorta. Aortoiliac atheromatous   changes.  RETROPERITONEUM/LYMPH NODES: Prominent adenopathy along the course of the   inflamed colon is likely reactive in nature.  ABDOMINAL WALL: Tiny fat-containing umbilical hernia.  BONES: Degenerative changes.    IMPRESSION:    Limited noncontrast study.    Moderate to severe colitis of the left colon and sigmoid colon. Prominent   adjacent adenopathy is likely reactive in nature. No abscess or free air.   Follow to resolution with repeat CT abdomen pelvis in 10-14 days.   Consider colonoscopy once acute symptoms have resolved.    Few partially imaged clustered nodules of the right middle lobe, images   1-4 of series 3. These findings may be infectious or inflammatory.   Correlate with chest CT.    Common bile duct is enlarged for patient age, measuring approximately 1   cm. Correlate with LFTs.    --- End of Report ---            SHERON RODRIGUEZ M.D., ATTENDING RADIOLOGIST  This document has been electronically signed. 2022  4:45PM    < end of copied text >

## 2022-06-22 NOTE — CONSULT NOTE ADULT - REASON FOR ADMISSION
Abdominal pain & diarrhea.

## 2022-06-22 NOTE — CONSULT NOTE ADULT - SUBJECTIVE AND OBJECTIVE BOX
HPI:  68YO F PMH of HTN, and Dyslipidemia, who presented to the hospital with cc of 3 weeks history of diarrhea with abdominal pain no associated nausea or vomiting, but reports low grade fever. Denies recent antibiotic use, No recent travel or sick contacts. CT AP showed Moderate to severe colitis of the left colon and sigmoid colon. Febrile Tmax 101.7 WBC wnl.     Infectious Disease consult was called to evaluate pt and for antibiotic management.      Past Medical & Surgical Hx:  PAST MEDICAL & SURGICAL HISTORY:  HTN (hypertension)  Dyslipidemia  H/O cervical spine surgery    Social History--  EtOH: denies   Tobacco: denies   Drug Use: denies       FAMILY HISTORY:  FH: colon cancer (Mother)      Allergies  No Known Allergies    Intolerances  NONE      Home Medications:  OXYCODONE HCL 15MG TABS: TAKE ONE TABLET BY MOUTH THREE TIMES A DAY AS NEEDED (22 Jun 2022 11:23)      Current Inpatient Medications :    ANTIBIOTICS:   piperacillin/tazobactam IVPB.. 3.375 Gram(s) IV Intermittent every 8 hours      OTHER RELEVANT MEDICATIONS :  acetaminophen     Tablet .. 650 milliGRAM(s) Oral every 6 hours PRN  acetaminophen     Tablet .. 650 milliGRAM(s) Oral every 6 hours PRN  Biotene Dry Mouth Oral Rinse 5 milliLiter(s) Swish and Spit four times a day  dextrose 5% + sodium chloride 0.9% with potassium chloride 20 mEq/L 1000 milliLiter(s) IV Continuous <Continuous>  heparin   Injectable 5000 Unit(s) SubCutaneous every 12 hours  ondansetron Injectable 4 milliGRAM(s) IV Push every 4 hours PRN  oxyCODONE    IR 5 milliGRAM(s) Oral every 4 hours PRN  oxyCODONE    IR 15 milliGRAM(s) Oral three times a day PRN        ROS:  CONSTITUTIONAL:  +fever or chills  EYES:  Negative  blurry vision or double vision  CARDIOVASCULAR:  Negative for chest pain or palpitations  RESPIRATORY:  Negative for cough, wheezing, or SOB   GASTROINTESTINAL:  Negative for nausea, vomiting, constipation, + abdominal pain diarrhea  GENITOURINARY:  Negative frequency, urgency , dysuria or hematuria  NEUROLOGIC:  No headache, confusion, dizziness, lightheadedness  All other systems were reviewed and are negative    Physical Exam:  Vital Signs Last 24 Hrs  T(C): 36.8 (22 Jun 2022 07:42), Max: 38.7 (22 Jun 2022 01:45)  T(F): 98.3 (22 Jun 2022 07:42), Max: 101.7 (22 Jun 2022 01:45)  HR: 74 (22 Jun 2022 07:42) (74 - 104)  BP: 100/62 (22 Jun 2022 07:42) (100/62 - 156/72)  RR: 16 (22 Jun 2022 07:42) (16 - 20)  SpO2: 96% (22 Jun 2022 07:42) (96% - 100%)  Height (cm): 160 (06-21 @ 15:25)  Weight (kg): 61.2 (06-21 @ 15:25)  BMI (kg/m2): 23.9 (06-21 @ 15:25)  BSA (m2): 1.64 (06-21 @ 15:25)    General: no acute distress  Neck: supple, trachea midline  Lungs: clear, no wheeze/rhonchi  Cardiovascular: regular rate and rhythm, S1 S2  Abdomen: soft, lower abd tender, ND, bowel sounds normal  Neurological:  alert and oriented x3  Skin: no rash  Extremities: no edema    Labs:                         8.9    9.76  )-----------( 340      ( 22 Jun 2022 06:00 )             27.2   06-22    132<L>  |  97  |  12  ----------------------------<  162<H>  2.7<LL>   |  27  |  1.43<H>    Ca    8.2<L>      22 Jun 2022 06:00  Phos  3.0     06-22  Mg     1.9     06-22    TPro  5.7<L>  /  Alb  2.0<L>  /  TBili  0.4  /  DBili  x   /  AST  18  /  ALT  16  /  AlkPhos  57  06-22      RECENT CULTURES:          RADIOLOGY & ADDITIONAL STUDIES:    ACC: [id number] EXAM:  CT ABDOMEN AND PELVIS                          PROCEDURE DATE:  06/21/2022          INTERPRETATION:  CLINICAL INFORMATION: Diarrhea and left lower quadrant   pain.    COMPARISON: None.    CONTRAST/COMPLICATIONS:  IV Contrast: NONE  Oral Contrast: NONE  Complications: None reported at time of study completion    PROCEDURE:  CT of the Abdomen and Pelvis was performed.  Sagittal and coronal reformats were performed.    FINDINGS:    LOWER CHEST: Few partially imaged clustered nodules of the right middle   lobe, images 1-4 of series 3. These findings may be infectious or   inflammatory. Correlate with chest CT. No visualized pleural effusion.    Solid organ evaluation limited due to noncontrast technique.    LIVER: Measures 17.5 cm craniocaudal dimension  BILE DUCTS: No intrahepatic ductal dilatation. Common bile duct is   enlarged for patient age, measuring approximately 1 cm. Correlate with   LFTs.  GALLBLADDER: Contracted  SPLEEN: Spleen size within normal limits  PANCREAS: No acute peripancreatic inflammation  ADRENALS: Unremarkable  KIDNEYS/URETERS: No hydronephrosis    BLADDER: Underdistended  REPRODUCTIVE ORGANS: Myomatous uterus, suboptimally characterized on CT.    BOWEL: Stomach is underdistended. No small bowel distention. The appendix   is nondistended and noninflamed. There is long segment moderate to severe   mural thickening of the left colon and sigmoid colon concerning for   colitis. Adjacent reactive adenopathy is noted. Colonic diverticulosis is   also noted without clear evidence of focal diverticulitis.  PERITONEUM: Trace fluid. No loculated fluid collection or free air.  VESSELS: No aneurysm of the abdominal aorta. Aortoiliac atheromatous   changes.  RETROPERITONEUM/LYMPH NODES: Prominent adenopathy along the course of the   inflamed colon is likely reactive in nature.  ABDOMINAL WALL: Tiny fat-containing umbilical hernia.  BONES: Degenerative changes.    IMPRESSION:    Limited noncontrast study.    Moderate to severe colitis of the left colon and sigmoid colon. Prominent   adjacent adenopathy is likely reactive in nature. No abscess or free air.   Follow to resolution with repeat CT abdomen pelvis in 10-14 days.   Consider colonoscopy once acute symptoms have resolved.    Few partially imaged clustered nodules of the right middle lobe, images   1-4 of series 3. These findings may be infectious or inflammatory.   Correlate with chest CT.    Common bile duct is enlarged for patient age, measuring approximately 1   cm. Correlate with LFTs.    Assessment :   68YO F PMH of HTN, and Dyslipidemia, who presented to the hospital with cc of 3 weeks history of diarrhea with abdominal pain found to have Moderate to severe colitis of the left colon and sigmoid colon.   Febrile Tmax 101.7  rule out infectious colitis  rule out Cdiff  ? acute diverticulitis    Plan :   Cont Zosyn  Fu cultures  Trend temps and cbc  Serial abd exams  GI on case    D/w Dr Green    Continue with present regiment .  Approptiate use of antibiotics and adverse effects reviewed.      I have discussed the above plan of care with patient in detail. She expressed understanding of the treatment plan . Risks, benefits and alternatives discussed in detail. I have asked if she has any questions or concerns and appropriately addressed them to the best of my ability .      > 45 minutes spent in direct patient care reviewing  the notes, lab data/ imaging , discussion with multidisciplinary team. All questions were addressed and answered to the best of my capacity .    Thank you for allowing me to participate in the care of your patient .      Rach Cisneros MD  Infectious Disease  [phone]

## 2022-06-22 NOTE — CONSULT NOTE ADULT - SUBJECTIVE AND OBJECTIVE BOX
Chief Complaint:  Patient is a 67y old  Female who presents with a chief complaint of Abdominal pain & diarrhea. (2022 11:04)    HTN (hypertension)    Dyslipidemia    H/O cervical spine surgery       HPI:  This is a 66 y/o F with PMH of HTN, and Dyslipidemia, who presented with 3 weeks history of diarrhea with abdominal pain, describes the diarrhea as pure mucous, starts everyday in the morning and continue for the whole day, no blood in stools, but only on the toilet paper, pain is crampy, diffuse allover the abdomen, max at the LLQ, no associated nausea or vomiting, but reports low grade fever, seen her doctor yesterday who recommended a CT, but she preferred to come to the ED. Patient has hemorrhoids for long years since she had her 1st child, never got a screening colonoscopy despite her strong family history for colon CA (mother).  (2022 21:17)      No Known Allergies      acetaminophen     Tablet .. 650 milliGRAM(s) Oral every 6 hours PRN  acetaminophen     Tablet .. 650 milliGRAM(s) Oral every 6 hours PRN  Biotene Dry Mouth Oral Rinse 5 milliLiter(s) Swish and Spit four times a day  dextrose 5% + sodium chloride 0.9% with potassium chloride 20 mEq/L 1000 milliLiter(s) IV Continuous <Continuous>  heparin   Injectable 5000 Unit(s) SubCutaneous every 12 hours  ondansetron Injectable 4 milliGRAM(s) IV Push every 4 hours PRN  oxyCODONE    IR 5 milliGRAM(s) Oral every 4 hours PRN  oxyCODONE    IR 15 milliGRAM(s) Oral three times a day PRN  piperacillin/tazobactam IVPB.. 3.375 Gram(s) IV Intermittent every 8 hours        FAMILY HISTORY:  FH: colon cancer (Mother)          Review of Systems:    General:  No wt loss, fevers, chills, night sweats,fatigue,   Eyes:  Good vision, no reported pain  ENT:  No sore throat, pain, runny nose, dysphagia  CV:  No pain, palpitatioins, hypo/hypertension  Resp:  No dyspnea, cough, tachypnea, wheezing  :  No pain, bleeding, incontinence, nocturia  Muscle:  No pain, weakness  Neuro:  No weakness, tingling, memory problems  Psych:  No fatigue, insomnia, mood problems, depression  Endocrine:  No polyuria, polydypsia, cold/heat intolerance  Heme:  No petechiae, ecchymosis, easy bruisability  Skin:  No rash, tattoos, scars, edema    Relevant Family History:       Relevant Social History:       Physical Exam:    Vital Signs:  Vital Signs Last 24 Hrs  T(C): 36.8 (2022 07:42), Max: 38.7 (2022 01:45)  T(F): 98.3 (2022 07:42), Max: 101.7 (2022 01:45)  HR: 74 (2022 07:42) (74 - 104)  BP: 100/62 (2022 07:42) (100/62 - 156/72)  BP(mean): --  RR: 16 (2022 07:42) (16 - 20)  SpO2: 96% (2022 07:42) (96% - 100%)  Daily Height in cm: 160.02 (2022 15:25)    Daily     General:  Appears stated age, well-groomed, well-nourished, no distress  HEENT:  NC/AT,  conjunctivae clear and pink, no thyromegaly, nodules, adenopathy, no JVD  Chest:  Full & symmetric excursion, no increased effort, breath sounds clear  Cardiovascular:  Regular rhythm, S1, S2, no murmur/rub/S3/S4, no abdominal bruit, no edema  Abdomen:  Soft, non-tender, non-distended, normoactive bowel sounds,  no masses ,no hepatosplenomeagaly, no signs of chronic liver disease  Extremities:  no cyanosis,clubbing or edema  Skin:  No rash/erythema/ecchymoses/petechiae/wounds/abscess/warm/dry  Neuro/Psych:  Alert, oriented, no asterixis, no tremor, no encephalopathy    Laboratory:                            8.9    9.76  )-----------( 340      ( 2022 06:00 )             27.2     06-    132<L>  |  97  |  12  ----------------------------<  162<H>  2.7<LL>   |  27  |  1.43<H>    Ca    8.2<L>      2022 06:00  Phos  3.0     06-22  Mg     1.9     06-22    TPro  5.7<L>  /  Alb  2.0<L>  /  TBili  0.4  /  DBili  x   /  AST  18  /  ALT  16  /  AlkPhos  57  06-22    LIVER FUNCTIONS - ( 2022 06:00 )  Alb: 2.0 g/dL / Pro: 5.7 g/dL / ALK PHOS: 57 U/L / ALT: 16 U/L DA / AST: 18 U/L / GGT: x           PT/INR - ( 2022 20:18 )   PT: 13.6 sec;   INR: 1.18 ratio         PTT - ( 2022 20:18 )  PTT:24.7 sec  Urinalysis Basic - ( 2022 02:38 )    Color: Yellow / Appearance: Clear / S.010 / pH: x  Gluc: x / Ketone: Negative  / Bili: Negative / Urobili: Negative mg/dL   Blood: x / Protein: 15 mg/dL / Nitrite: Negative   Leuk Esterase: Negative / RBC: x / WBC 0-2   Sq Epi: x / Non Sq Epi: Occasional / Bacteria: Occasional        Imaging:

## 2022-06-22 NOTE — CONSULT NOTE ADULT - ASSESSMENT
66 y/o F with PMH of HTN, and Dyslipidemia, who presented with 3 weeks history of diarrhea with abdominal pain, describes the diarrhea as pure mucous, starts everyday in the morning and continue for the whole day, no blood in stools, but only on the toilet paper, pain is crampy, diffuse allover the abdomen, max at the LLQ, no associated nausea or vomiting, but reports low grade fever,    abd pain  diarrhea  anemia  EDUARDO  HTN  HLD  Pulm Nodules    gi eval - eval for colitis - abd pain - diarrhea - febrile illness - stool cx - ova and parasites  [facility] rx regimen - HLD - HTN  EDUARDO - hydration - serial renal indices - replete lytes -   pulm nodule - / cluster described in RML on CT abd - will check dedicated CT chest   pain assessment - serial abd exam  out of bed as tolerated  mobilize  monitor Na levels -   dvt p  w/u in progress  pt is on emp ABX  SBIRT consult  pt reports social drinking - non smoker - denies drug use  
Colitis  IVF  IVAB  GI PCR  advance diet  outpt colonoscopy
Prerenal azotemia, CKD 3  Hypokalemia: GI losses, Decreased PO intake  Anemia  Colitis  Hyponatremia    Improving sodium levels with saline infusion. Potassium remains low with ongoing losses. On IV potassium riders as pt tolerates.   Add PO supps if pt allowed to take PO. Monitor h/h trend. Transfuse PRBC's PRN. GI evaluation.   Will follow electrolytes and renal function trend. Further recommendations pending clinical course. Thank you for the courtesy of this referral.

## 2022-06-23 DIAGNOSIS — D64.9 ANEMIA, UNSPECIFIED: ICD-10-CM

## 2022-06-23 LAB
ALBUMIN SERPL ELPH-MCNC: 1.9 G/DL — LOW (ref 3.3–5)
ALP SERPL-CCNC: 64 U/L — SIGNIFICANT CHANGE UP (ref 30–120)
ALT FLD-CCNC: 11 U/L DA — SIGNIFICANT CHANGE UP (ref 10–60)
ANION GAP SERPL CALC-SCNC: 6 MMOL/L — SIGNIFICANT CHANGE UP (ref 5–17)
AST SERPL-CCNC: 19 U/L — SIGNIFICANT CHANGE UP (ref 10–40)
AUTO DIFF PNL BLD: NEGATIVE — SIGNIFICANT CHANGE UP
BASOPHILS # BLD AUTO: 0.04 K/UL — SIGNIFICANT CHANGE UP (ref 0–0.2)
BASOPHILS NFR BLD AUTO: 0.4 % — SIGNIFICANT CHANGE UP (ref 0–2)
BILIRUB SERPL-MCNC: 0.3 MG/DL — SIGNIFICANT CHANGE UP (ref 0.2–1.2)
BUN SERPL-MCNC: 9 MG/DL — SIGNIFICANT CHANGE UP (ref 7–23)
C DIFF BY PCR RESULT: SIGNIFICANT CHANGE UP
C DIFF TOX GENS STL QL NAA+PROBE: SIGNIFICANT CHANGE UP
C-ANCA SER-ACNC: NEGATIVE — SIGNIFICANT CHANGE UP
CALCIUM SERPL-MCNC: 8 MG/DL — LOW (ref 8.4–10.5)
CHLORIDE SERPL-SCNC: 102 MMOL/L — SIGNIFICANT CHANGE UP (ref 96–108)
CO2 SERPL-SCNC: 26 MMOL/L — SIGNIFICANT CHANGE UP (ref 22–31)
CREAT SERPL-MCNC: 1.07 MG/DL — SIGNIFICANT CHANGE UP (ref 0.5–1.3)
EGFR: 58 ML/MIN/1.73M2 — LOW
EOSINOPHIL # BLD AUTO: 0.02 K/UL — SIGNIFICANT CHANGE UP (ref 0–0.5)
EOSINOPHIL NFR BLD AUTO: 0.2 % — SIGNIFICANT CHANGE UP (ref 0–6)
FERRITIN SERPL-MCNC: 595 NG/ML — HIGH (ref 15–150)
FOLATE SERPL-MCNC: 7.2 NG/ML — SIGNIFICANT CHANGE UP
GLUCOSE SERPL-MCNC: 158 MG/DL — HIGH (ref 70–99)
HCT VFR BLD CALC: 27.2 % — LOW (ref 34.5–45)
HGB BLD-MCNC: 9 G/DL — LOW (ref 11.5–15.5)
IMM GRANULOCYTES NFR BLD AUTO: 0.9 % — SIGNIFICANT CHANGE UP (ref 0–1.5)
IRON SATN MFR SERPL: 11 UG/DL — LOW (ref 30–160)
IRON SATN MFR SERPL: 8 % — LOW (ref 14–50)
LYMPHOCYTES # BLD AUTO: 0.72 K/UL — LOW (ref 1–3.3)
LYMPHOCYTES # BLD AUTO: 6.7 % — LOW (ref 13–44)
MAGNESIUM SERPL-MCNC: 1.8 MG/DL — SIGNIFICANT CHANGE UP (ref 1.6–2.6)
MCHC RBC-ENTMCNC: 29.7 PG — SIGNIFICANT CHANGE UP (ref 27–34)
MCHC RBC-ENTMCNC: 33.1 GM/DL — SIGNIFICANT CHANGE UP (ref 32–36)
MCV RBC AUTO: 89.8 FL — SIGNIFICANT CHANGE UP (ref 80–100)
MONOCYTES # BLD AUTO: 1.07 K/UL — HIGH (ref 0–0.9)
MONOCYTES NFR BLD AUTO: 9.9 % — SIGNIFICANT CHANGE UP (ref 2–14)
NEUTROPHILS # BLD AUTO: 8.81 K/UL — HIGH (ref 1.8–7.4)
NEUTROPHILS NFR BLD AUTO: 81.9 % — HIGH (ref 43–77)
NRBC # BLD: 0 /100 WBCS — SIGNIFICANT CHANGE UP (ref 0–0)
P-ANCA SER-ACNC: NEGATIVE — SIGNIFICANT CHANGE UP
PHOSPHATE SERPL-MCNC: 2.6 MG/DL — SIGNIFICANT CHANGE UP (ref 2.5–4.5)
PLATELET # BLD AUTO: 353 K/UL — SIGNIFICANT CHANGE UP (ref 150–400)
POTASSIUM SERPL-MCNC: 3.5 MMOL/L — SIGNIFICANT CHANGE UP (ref 3.5–5.3)
POTASSIUM SERPL-SCNC: 3.5 MMOL/L — SIGNIFICANT CHANGE UP (ref 3.5–5.3)
PROT SERPL-MCNC: 5.9 G/DL — LOW (ref 6–8.3)
RBC # BLD: 3.03 M/UL — LOW (ref 3.8–5.2)
RBC # FLD: 12.6 % — SIGNIFICANT CHANGE UP (ref 10.3–14.5)
SODIUM SERPL-SCNC: 134 MMOL/L — LOW (ref 135–145)
TIBC SERPL-MCNC: 130 UG/DL — LOW (ref 220–430)
UIBC SERPL-MCNC: 119 UG/DL — SIGNIFICANT CHANGE UP (ref 110–370)
VIT B12 SERPL-MCNC: 511 PG/ML — SIGNIFICANT CHANGE UP (ref 232–1245)
WBC # BLD: 10.76 K/UL — HIGH (ref 3.8–10.5)
WBC # FLD AUTO: 10.76 K/UL — HIGH (ref 3.8–10.5)

## 2022-06-23 PROCEDURE — 99233 SBSQ HOSP IP/OBS HIGH 50: CPT

## 2022-06-23 RX ORDER — POTASSIUM CHLORIDE 20 MEQ
40 PACKET (EA) ORAL ONCE
Refills: 0 | Status: COMPLETED | OUTPATIENT
Start: 2022-06-23 | End: 2022-06-23

## 2022-06-23 RX ADMIN — Medication 5 MILLILITER(S): at 00:36

## 2022-06-23 RX ADMIN — Medication 5 MILLILITER(S): at 05:16

## 2022-06-23 RX ADMIN — HEPARIN SODIUM 5000 UNIT(S): 5000 INJECTION INTRAVENOUS; SUBCUTANEOUS at 05:16

## 2022-06-23 RX ADMIN — PIPERACILLIN AND TAZOBACTAM 25 GRAM(S): 4; .5 INJECTION, POWDER, LYOPHILIZED, FOR SOLUTION INTRAVENOUS at 05:16

## 2022-06-23 RX ADMIN — Medication 5 MILLILITER(S): at 18:05

## 2022-06-23 RX ADMIN — Medication 40 MILLIEQUIVALENT(S): at 18:05

## 2022-06-23 RX ADMIN — PIPERACILLIN AND TAZOBACTAM 25 GRAM(S): 4; .5 INJECTION, POWDER, LYOPHILIZED, FOR SOLUTION INTRAVENOUS at 20:58

## 2022-06-23 RX ADMIN — DEXTROSE MONOHYDRATE, SODIUM CHLORIDE, AND POTASSIUM CHLORIDE 75 MILLILITER(S): 50; .745; 4.5 INJECTION, SOLUTION INTRAVENOUS at 00:36

## 2022-06-23 RX ADMIN — Medication 5 MILLILITER(S): at 23:55

## 2022-06-23 NOTE — DIETITIAN INITIAL EVALUATION ADULT - OTHER INFO
Per H&P, "This is a 68 y/o F with PMH of HTN, and Dyslipidemia, who presented with 3 weeks history of diarrhea with abdominal pain, describes the diarrhea as pure mucous, starts everyday in the morning and continue for the whole day, no blood in stools, but only on the toilet paper, pain is crampy, diffuse allover the abdomen, max at the LLQ, no associated nausea or vomiting, but reports low grade fever, seen her doctor yesterday who recommended a CT, but she preferred to come to the ED. Patient has hemorrhoids for long years since she had her 1st child, never got a screening colonoscopy despite her strong family history for colon CA (mother)."    Pt seen for nutrition assessment secondary to reports of prolonged GI distress (abd pain, diarrhea x 3 weeks). Per H&P, "This is a 68 y/o F with PMH of HTN, and Dyslipidemia, who presented with 3 weeks history of diarrhea with abdominal pain, describes the diarrhea as pure mucous, starts everyday in the morning and continue for the whole day, no blood in stools, but only on the toilet paper, pain is crampy, diffuse allover the abdomen, max at the LLQ, no associated nausea or vomiting, but reports low grade fever, seen her doctor yesterday who recommended a CT, but she preferred to come to the ED. Patient has hemorrhoids for long years since she had her 1st child, never got a screening colonoscopy despite her strong family history for colon CA (mother)."    Pt seen for nutrition assessment secondary to reports of prolonged GI distress (abd pain, diarrhea x 3 weeks).  Pt admitted with colitis dx.  Pt made NPO on admission, diet initiated to clear liquids yesterday and progressed to low fiber diet today.  GI following; on IV abx, on contact precautions r/o c.diff.  On D5NS+KCl@75ml/hr for hydration/glycemic stabilization.  Pt eating minimally, reports she consumed a few bites of lunch meal.  Discussed providing blander meals to optimize po intake and gi tolerance (meal selections obtained).  Pt reports abd pain is starting to improving, appears eager for discharge.  Provided low fiber diet education (written/verbal).  Pt reports she usually eats 2x meals per day (breakfast and dinner- usually protein/veg/starch), snacks as desired.  Reports UBW ~138# but does not weigh self frequently, endorses significant decrease in oral intake PTA x 3-4 weeks, mainly consuming liquids as solid foods appeared to exacerbate frequency of diarrhea.  Current adm wt 134# suggesting 4#/2.8% wt loss x 1 month (not clinically significant).  Encouraged adequate protein intake while appetite remains poor to help preserve LBM, prevent further wt loss.  Reports BMs are regular at baseline. RD to follow up and will continue to monitor pt's nutrition status.

## 2022-06-23 NOTE — DIETITIAN INITIAL EVALUATION ADULT - PERTINENT MEDS FT
MEDICATIONS  (STANDING):  Biotene Dry Mouth Oral Rinse 5 milliLiter(s) Swish and Spit four times a day  dextrose 5% + sodium chloride 0.9% with potassium chloride 20 mEq/L 1000 milliLiter(s) (75 mL/Hr) IV Continuous <Continuous>  heparin   Injectable 5000 Unit(s) SubCutaneous every 12 hours  piperacillin/tazobactam IVPB.. 3.375 Gram(s) IV Intermittent every 8 hours    MEDICATIONS  (PRN):  acetaminophen     Tablet .. 650 milliGRAM(s) Oral every 6 hours PRN Mild Pain (1 - 3)  acetaminophen     Tablet .. 650 milliGRAM(s) Oral every 6 hours PRN Temp greater or equal to 38C (100.4F)  ondansetron Injectable 4 milliGRAM(s) IV Push every 4 hours PRN Nausea and/or Vomiting  oxyCODONE    IR 5 milliGRAM(s) Oral every 4 hours PRN Moderate Pain (4 - 6)  oxyCODONE    IR 15 milliGRAM(s) Oral three times a day PRN Severe Pain (7 - 10)

## 2022-06-23 NOTE — DIETITIAN INITIAL EVALUATION ADULT - PERTINENT LABORATORY DATA
06-23    134<L>  |  102  |  9   ----------------------------<  158<H>  3.5   |  26  |  1.07    Ca    8.0<L>      23 Jun 2022 08:02  Phos  2.6     06-23  Mg     1.8     06-23    TPro  5.9<L>  /  Alb  1.9<L>  /  TBili  0.3  /  DBili  x   /  AST  19  /  ALT  11  /  AlkPhos  64  06-23

## 2022-06-23 NOTE — PROGRESS NOTE ADULT - PROBLEM SELECTOR PLAN 7
not on any medications for that, stated she tried 2 different statins & both gave her a bad reaction including skin rash, edema, and arthralgias, not willing to try any other medications even from other groups, she understands & agreed that she have an abnormal EKG & needs to see a cardiologist, and he can also take care of her cholesterol.

## 2022-06-23 NOTE — DIETITIAN INITIAL EVALUATION ADULT - SIGNS/SYMPTOMS
as evidenced by abd pain, diarrhea x3 weeks, acute colitis dx, IV abx, clear liquids->low fiber diet

## 2022-06-23 NOTE — DIETITIAN INITIAL EVALUATION ADULT - NUTRITIONGOAL OUTCOME1
pt to tolerate low fiber diet with resolving gi complaints; to consume >50% of most meals; deter wt loss

## 2022-06-23 NOTE — DIETITIAN INITIAL EVALUATION ADULT - ENTER TO (G/KG)
Date & Time: 5/30/2023, 12:54 PM  Patient: Lorraine Houston  Encounter Provider(s):    Edgard Verdin PA-C       To Whom It May Concern:    Alexandru Contreras was seen and treated in our department on 5/30/2023. She should not return to work until 6/2/23 .     If you have any questions or concerns, please do not hesitate to call.        _____________________________  Physician/APC Signature 1.3

## 2022-06-23 NOTE — CHART NOTE - NSCHARTNOTEFT_GEN_A_CORE
Confidential Drug Utilization Report            Search Terms:susanne padilla, 1955     Search Date:06/23/2022 17:34:11 PM       Searching on behalf of:Myself     The Drug Utilization Report below displays all of the controlled substance prescriptions, if any, that your patient has filled in the last twelve months. The information displayed on this report is compiled from pharmacy submissions to the Department, and accurately reflects the information as submitted by the pharmacies.    This report was requested by:Mona Green|Reference #:[id number]              Others' Prescriptions        Patient Name:Susanne Padilla     YOB: 1955       Address:[address] [place] [address]     Sex:Female                     Rx Written    Rx Dispensed    Drug    Quantity    Days Supply    Prescriber Name    Prescriber Kimi #    Payment Method    Dispenser      06/02/2022 06/14/2022 oxycodone hcl (ir) 15 mg tab  90 30 Gui Oneil PA-C [id number] [id number]   05/16/2022 05/17/2022 oxycodone hcl (ir) 15 mg tab  90 30 Juan Jose Wilson MD [id number] [id number]   04/07/2022 04/16/2022 oxycodone hcl (ir) 15 mg tab  90 30 Gui Oneil PA-C [id number] [id number]   03/10/2022 03/19/2022 oxycodone hcl (ir) 15 mg tab  90 30 Gui Oneil PA-C [id number] [id number]   02/10/2022 02/19/2022 oxycodone hcl (ir) 15 mg tab  90 30 Gui Oneil PA-C [id number] [id number]   01/13/2022 01/20/2022 oxycodone hcl (ir) 15 mg tab  90 30 Gui Oneil PA-C [id number] [id number]   12/16/2021 12/21/2021 oxycodone hcl (ir) 15 mg tab  90 30 Gui Oneil PA-C [id number] [id number]   11/18/2021 11/23/2021 oxycodone hcl (ir) 15 mg tab  90 30 Gui Oneil PA-C [id number] [id number]   10/21/2021 10/26/2021 oxycodone hcl 15 mg tablet  90 30 Gui Oneil PA-C [id number] [id number]   09/23/2021 09/28/2021 oxycodone hcl 15 mg tablet  90 30 Gui Oneil PA-C [id number] [id number]   08/26/2021 08/31/2021 oxycodone hcl 15 mg tablet  90 30 Zachariah Mejias MD [id number] [id number]   07/29/2021 08/02/2021 oxycodone hcl (ir) 15 mg tab  90 30 Gui Oneil PA-C [id number] [id number]   07/13/2021 07/19/2021 oxycodone hcl 15 mg tablet  45 15 Gui Oneil PA-C [id number] [id number]

## 2022-06-24 VITALS
SYSTOLIC BLOOD PRESSURE: 127 MMHG | RESPIRATION RATE: 16 BRPM | HEART RATE: 78 BPM | DIASTOLIC BLOOD PRESSURE: 73 MMHG | OXYGEN SATURATION: 97 % | TEMPERATURE: 98 F

## 2022-06-24 LAB
ANION GAP SERPL CALC-SCNC: 10 MMOL/L — SIGNIFICANT CHANGE UP (ref 5–17)
BUN SERPL-MCNC: 7 MG/DL — SIGNIFICANT CHANGE UP (ref 7–23)
CALCIUM SERPL-MCNC: 8.4 MG/DL — SIGNIFICANT CHANGE UP (ref 8.4–10.5)
CHLORIDE SERPL-SCNC: 107 MMOL/L — SIGNIFICANT CHANGE UP (ref 96–108)
CO2 SERPL-SCNC: 24 MMOL/L — SIGNIFICANT CHANGE UP (ref 22–31)
CREAT SERPL-MCNC: 0.97 MG/DL — SIGNIFICANT CHANGE UP (ref 0.5–1.3)
EGFR: 65 ML/MIN/1.73M2 — SIGNIFICANT CHANGE UP
GLUCOSE SERPL-MCNC: 138 MG/DL — HIGH (ref 70–99)
HCT VFR BLD CALC: 28.4 % — LOW (ref 34.5–45)
HGB BLD-MCNC: 9 G/DL — LOW (ref 11.5–15.5)
MCHC RBC-ENTMCNC: 28.8 PG — SIGNIFICANT CHANGE UP (ref 27–34)
MCHC RBC-ENTMCNC: 31.7 GM/DL — LOW (ref 32–36)
MCV RBC AUTO: 91 FL — SIGNIFICANT CHANGE UP (ref 80–100)
NRBC # BLD: 0 /100 WBCS — SIGNIFICANT CHANGE UP (ref 0–0)
PLATELET # BLD AUTO: 348 K/UL — SIGNIFICANT CHANGE UP (ref 150–400)
POTASSIUM SERPL-MCNC: 3.4 MMOL/L — LOW (ref 3.5–5.3)
POTASSIUM SERPL-SCNC: 3.4 MMOL/L — LOW (ref 3.5–5.3)
RBC # BLD: 3.12 M/UL — LOW (ref 3.8–5.2)
RBC # FLD: 12.7 % — SIGNIFICANT CHANGE UP (ref 10.3–14.5)
SODIUM SERPL-SCNC: 141 MMOL/L — SIGNIFICANT CHANGE UP (ref 135–145)
WBC # BLD: 7.77 K/UL — SIGNIFICANT CHANGE UP (ref 3.8–10.5)
WBC # FLD AUTO: 7.77 K/UL — SIGNIFICANT CHANGE UP (ref 3.8–10.5)

## 2022-06-24 PROCEDURE — 86803 HEPATITIS C AB TEST: CPT

## 2022-06-24 PROCEDURE — 93005 ELECTROCARDIOGRAM TRACING: CPT

## 2022-06-24 PROCEDURE — 81001 URINALYSIS AUTO W/SCOPE: CPT

## 2022-06-24 PROCEDURE — 86038 ANTINUCLEAR ANTIBODIES: CPT

## 2022-06-24 PROCEDURE — 86036 ANCA SCREEN EACH ANTIBODY: CPT

## 2022-06-24 PROCEDURE — 84300 ASSAY OF URINE SODIUM: CPT

## 2022-06-24 PROCEDURE — 87635 SARS-COV-2 COVID-19 AMP PRB: CPT

## 2022-06-24 PROCEDURE — 82728 ASSAY OF FERRITIN: CPT

## 2022-06-24 PROCEDURE — 85730 THROMBOPLASTIN TIME PARTIAL: CPT

## 2022-06-24 PROCEDURE — 83935 ASSAY OF URINE OSMOLALITY: CPT

## 2022-06-24 PROCEDURE — 99285 EMERGENCY DEPT VISIT HI MDM: CPT | Mod: 25

## 2022-06-24 PROCEDURE — 84443 ASSAY THYROID STIM HORMONE: CPT

## 2022-06-24 PROCEDURE — 82378 CARCINOEMBRYONIC ANTIGEN: CPT

## 2022-06-24 PROCEDURE — 82607 VITAMIN B-12: CPT

## 2022-06-24 PROCEDURE — 36415 COLL VENOUS BLD VENIPUNCTURE: CPT

## 2022-06-24 PROCEDURE — 87045 FECES CULTURE AEROBIC BACT: CPT

## 2022-06-24 PROCEDURE — 87493 C DIFF AMPLIFIED PROBE: CPT

## 2022-06-24 PROCEDURE — 82746 ASSAY OF FOLIC ACID SERUM: CPT

## 2022-06-24 PROCEDURE — 84145 PROCALCITONIN (PCT): CPT

## 2022-06-24 PROCEDURE — 85025 COMPLETE CBC W/AUTO DIFF WBC: CPT

## 2022-06-24 PROCEDURE — 85652 RBC SED RATE AUTOMATED: CPT

## 2022-06-24 PROCEDURE — 83735 ASSAY OF MAGNESIUM: CPT

## 2022-06-24 PROCEDURE — 83540 ASSAY OF IRON: CPT

## 2022-06-24 PROCEDURE — 99239 HOSP IP/OBS DSCHRG MGMT >30: CPT

## 2022-06-24 PROCEDURE — 80048 BASIC METABOLIC PNL TOTAL CA: CPT

## 2022-06-24 PROCEDURE — 74176 CT ABD & PELVIS W/O CONTRAST: CPT | Mod: MA

## 2022-06-24 PROCEDURE — 87177 OVA AND PARASITES SMEARS: CPT

## 2022-06-24 PROCEDURE — 85610 PROTHROMBIN TIME: CPT

## 2022-06-24 PROCEDURE — 87077 CULTURE AEROBIC IDENTIFY: CPT

## 2022-06-24 PROCEDURE — 84100 ASSAY OF PHOSPHORUS: CPT

## 2022-06-24 PROCEDURE — 86431 RHEUMATOID FACTOR QUANT: CPT

## 2022-06-24 PROCEDURE — 87046 STOOL CULTR AEROBIC BACT EA: CPT

## 2022-06-24 PROCEDURE — 85027 COMPLETE CBC AUTOMATED: CPT

## 2022-06-24 PROCEDURE — 83550 IRON BINDING TEST: CPT

## 2022-06-24 PROCEDURE — 80053 COMPREHEN METABOLIC PANEL: CPT

## 2022-06-24 PROCEDURE — 86140 C-REACTIVE PROTEIN: CPT

## 2022-06-24 RX ORDER — CEFPODOXIME PROXETIL 100 MG
1 TABLET ORAL
Qty: 10 | Refills: 0
Start: 2022-06-24 | End: 2022-06-28

## 2022-06-24 RX ORDER — METRONIDAZOLE 500 MG
1 TABLET ORAL
Qty: 15 | Refills: 0
Start: 2022-06-24 | End: 2022-06-28

## 2022-06-24 RX ORDER — OXYCODONE HYDROCHLORIDE 5 MG/1
1 TABLET ORAL
Qty: 0 | Refills: 0 | DISCHARGE

## 2022-06-24 RX ORDER — POTASSIUM CHLORIDE 20 MEQ
40 PACKET (EA) ORAL ONCE
Refills: 0 | Status: COMPLETED | OUTPATIENT
Start: 2022-06-24 | End: 2022-06-24

## 2022-06-24 RX ORDER — OXYCODONE HYDROCHLORIDE 5 MG/1
0 TABLET ORAL
Qty: 0 | Refills: 0 | DISCHARGE

## 2022-06-24 RX ADMIN — DEXTROSE MONOHYDRATE, SODIUM CHLORIDE, AND POTASSIUM CHLORIDE 75 MILLILITER(S): 50; .745; 4.5 INJECTION, SOLUTION INTRAVENOUS at 05:38

## 2022-06-24 RX ADMIN — PIPERACILLIN AND TAZOBACTAM 25 GRAM(S): 4; .5 INJECTION, POWDER, LYOPHILIZED, FOR SOLUTION INTRAVENOUS at 05:37

## 2022-06-24 RX ADMIN — Medication 40 MILLIEQUIVALENT(S): at 12:12

## 2022-06-24 RX ADMIN — Medication 5 MILLILITER(S): at 05:38

## 2022-06-24 RX ADMIN — HEPARIN SODIUM 5000 UNIT(S): 5000 INJECTION INTRAVENOUS; SUBCUTANEOUS at 05:38

## 2022-06-24 NOTE — DISCHARGE NOTE PROVIDER - HOSPITAL COURSE
FROM ADMISSION H+P:   HPI:  This is a 66 y/o F with PMH of HTN, and Dyslipidemia, who presented with 3 weeks history of diarrhea with abdominal pain, describes the diarrhea as pure mucous, starts everyday in the morning and continue for the whole day, no blood in stools, but only on the toilet paper, pain is crampy, diffuse allover the abdomen, max at the LLQ, no associated nausea or vomiting, but reports low grade fever, seen her doctor yesterday who recommended a CT, but she preferred to come to the ED. Patient has hemorrhoids for long years since she had her 1st child, never got a screening colonoscopy despite her strong family history for colon CA (mother).  (21 Jun 2022 21:17)      ---  HOSPITAL COURSE:    Problem/Plan - 1:  ·  Problem: Acute colitis.   ·  Plan: Possibly infectious colitis versus inflammatory,   on Zosyn, switch to po Vantin and Flagyl per ID reccs   stool culture, stool PCR panel, stool for ova & parasites,   GI and ID following.     Problem/Plan - 2:  ·  Problem: EDUARDO (acute kidney injury).   ·  Plan: Renal function improved with IV hydration.     Problem/Plan - 3:  ·  Problem: Hypokalemia.   ·  Plan: Potassium supplemented  Levels improved today.     Problem/Plan - 4:  ·  Problem: Pulmonary nodule.   ·  Plan: pulm nodule - / cluster described in RML on CT abd   Pulm input appreciated, ordered for dedicated CT chest but refused  Discussed again with patient again but refused. Will need outpatient follow up.     Problem/Plan - 5:  ·  Problem: HTN (hypertension).   ·  Plan: Takes combo triamterene/HCTZ     Problem/Plan - 6:  ·  Problem: Anemia.   ·  Plan: No hx of anemia but has never had colonoscopy  Will need outpatient follow up with GI  Workup reflective of anemia of chronic disease.     Problem/Plan - 7:  ·  Problem: Dyslipidemia.   outpatient follow up      Problem/Plan - 8:  ·  Problem: Chronic neck pain.   ·  Plan: Verified with pharmacy  Takes Oxycodone 15mg PRN TID.  ---  TIME SPENT:  I, the attending physician, was physically present for the key portions of the evaluation and management (E/M) service provided. The total amount of time spent reviewing the hospital notes, laboratory values, imaging findings, assessing/counseling the patient, discussing with consultant physicians, social work, nursing staff was 37 minutes Yes - the patient is able to be screened

## 2022-06-24 NOTE — CHART NOTE - NSCHARTNOTEFT_GEN_A_CORE
The patient has decided to leave AMA. She was set to be discharged but noted to have a brief pause on cardiac monitor at 11:24am. Discussed with patient and cardiology, recommending 24 hour continued monitoring. Patient adamant about going home with outpatient follow up. The patient demonstrates adequate understanding that they are leaving against medical advice despite the risk of missing a potentially serious diagnosis which may lead to injury, permanent disability and/or death. The benefits of staying have been explained, including nursing and physician monitoring, diagnostic testing and treatment. The patient was able to understand and state the risks of leaving AMA.  The opportunity was provided for the patient to ask questions and I addressed the questions/concerns to the best of my ability. The patient understands that she can return anytime for medical care. Follow up has been discussed and  includes appointment with PCP (patient responsible for making appointment.) Also present at bedside for discussions: the patient's primary RN Shobha The patient has decided to leave AMA. She was set to be discharged but noted to have a brief pause on cardiac monitor at 11:24am. Discussed with patient and cardiology, recommending 24 hour continued monitoring. Patient adamant about going home with outpatient follow up. The patient demonstrates adequate understanding that they are leaving against medical advice despite the risk of missing a potentially serious diagnosis which may lead to injury, permanent disability and/or death. The benefits of staying have been explained, including nursing and physician monitoring, diagnostic testing and treatment. The patient was able to understand and state the risks of leaving AMA.  The opportunity was provided for the patient to ask questions and I addressed the questions/concerns to the best of my ability. The patient understands that she can return anytime for medical care. Follow up has been discussed and  includes appointment with PCP and cardiology (patient responsible for making appointment.) Also present at bedside for discussions: the patient's primary RN Shobha

## 2022-06-24 NOTE — DISCHARGE NOTE PROVIDER - CARE PROVIDERS DIRECT ADDRESSES
,DirectAddress_Unknown,[email or web address] ,[email or web address]_[email or web address]

## 2022-06-24 NOTE — DISCHARGE NOTE PROVIDER - NSDCCPCAREPLAN_GEN_ALL_CORE_FT
PRINCIPAL DISCHARGE DIAGNOSIS  Diagnosis: Colitis  Assessment and Plan of Treatment: Evaluated by GI and ID. Treated with course of antibiotics inpatient and showed improvement. Please follow up with GI outpatient for colonoscopy as directed      SECONDARY DISCHARGE DIAGNOSES  Diagnosis: Pulmonary nodule  Assessment and Plan of Treatment: Pulm nodule - / cluster described in RML on CT abd. Please follow up with pulmonary or PCP outpatient for CT chest for further management    Diagnosis: Dyslipidemia  Assessment and Plan of Treatment: Follow up with PCP for further workup and management    Diagnosis: Hypokalemia  Assessment and Plan of Treatment: Please follow up with PCP for repeat labs within 1 week of discharge     PRINCIPAL DISCHARGE DIAGNOSIS  Diagnosis: Colitis  Assessment and Plan of Treatment: Evaluated by GI and ID. Treated with course of antibiotics inpatient and showed improvement. Continue antibiotics to complete 5 day course. Please follow up with GI outpatient for colonoscopy as directed      SECONDARY DISCHARGE DIAGNOSES  Diagnosis: Pulmonary nodule  Assessment and Plan of Treatment: Pulm nodule - / cluster described in RML on CT abd. Please follow up with pulmonary or PCP outpatient for CT chest for further management    Diagnosis: Dyslipidemia  Assessment and Plan of Treatment: Follow up with PCP for further workup and management    Diagnosis: Hypokalemia  Assessment and Plan of Treatment: Please follow up with PCP for repeat labs within 1 week of discharge

## 2022-06-24 NOTE — PROGRESS NOTE ADULT - REASON FOR ADMISSION
Abdominal pain & diarrhea.

## 2022-06-24 NOTE — DISCHARGE NOTE NURSING/CASE MANAGEMENT/SOCIAL WORK - PATIENT PORTAL LINK FT
You can access the FollowMyHealth Patient Portal offered by [facility] by registering at the following website: [email or web address]. By joining [facility]’s FollowMyHealth portal, you will also be able to view your health information using other applications (apps) compatible with our system.

## 2022-06-24 NOTE — DISCHARGE NOTE PROVIDER - NSDCMRMEDTOKEN_GEN_ALL_CORE_FT
OXYCODONE HCL 15MG TABS: TAKE ONE TABLET BY MOUTH THREE TIMES A DAY AS NEEDED   cefpodoxime 200 mg oral tablet: 1 tab(s) orally 2 times a day   OXYCODONE HCL 15MG TABS: 1 each orally 3 times a day, As Needed   cefpodoxime 200 mg oral tablet: 1 tab(s) orally 2 times a day   metroNIDAZOLE 500 mg oral tablet: 1 tab(s) orally 3 times a day   OXYCODONE HCL 15MG TABS: 1 each orally 3 times a day, As Needed   cefpodoxime 200 mg oral tablet: 1 tab(s) orally 2 times a day   cefpodoxime 200 mg oral tablet: 1 tab(s) orally 2 times a day   metroNIDAZOLE 500 mg oral tablet: 1 tab(s) orally 3 times a day   OXYCODONE HCL 15MG TABS: 1 each orally 3 times a day, As Needed

## 2022-06-24 NOTE — PROGRESS NOTE ADULT - PROBLEM SELECTOR PLAN 1
Possibly infectious colitis versus inflammatory,   IVF hydration, on Zosyn,   stool culture, stool PCR panel, stool for ova & parasites,   GI consult with Dr. Nassar was called  ID consulted Dr. Cisneros
Possibly infectious colitis versus inflammatory,   IVF hydration, on Zosyn,   stool culture, stool PCR panel, stool for ova & parasites,   GI and ID following  will advance diet
Possibly infectious colitis versus inflammatory,   on Zosyn, switch to po Vantin and Flagyl    stool culture, stool PCR panel, stool for ova & parasites,   GI and ID following

## 2022-06-24 NOTE — DISCHARGE NOTE PROVIDER - NSDCACTIVITY_GEN_ALL_CORE
Do not drive or operate machinery/Do not make important decisions Do not drive or operate machinery/No heavy lifting/straining

## 2022-06-24 NOTE — PROGRESS NOTE ADULT - PROBLEM SELECTOR PLAN 5
Takes combo triamterene/HCTZ  BP controlled  Anticipate restarting home meds on dc
controlled, patient stated she doesn't remember her medications names, will provide her home medication list later, monitor for now.
Takes combo triamterene/HCTZ  BP controlled  Anticipate restarting home meds on dc

## 2022-06-24 NOTE — DISCHARGE NOTE PROVIDER - PROVIDER TOKENS
PROVIDER:[TOKEN:[[id number]],FOLLOWUP:[1 week]],PROVIDER:[TOKEN:[[id number]],FOLLOWUP:[1 week]],PROVIDER:[TOKEN:[[id number]],FOLLOWUP:[1 week]] PROVIDER:[TOKEN:[[id number]],FOLLOWUP:[1 week]],PROVIDER:[TOKEN:[[id number]],FOLLOWUP:[1 week]],PROVIDER:[TOKEN:[[id number]],FOLLOWUP:[1 week]],PROVIDER:[TOKEN:[[id number]],FOLLOWUP:[1 week]]

## 2022-06-24 NOTE — PROGRESS NOTE ADULT - PROBLEM SELECTOR PLAN 8
Verified with pharmacy  Takes Oxycodone 15mg PRN TID
Verified with pharmacy  Takes Oxycodone 15mg PRN TID
Heparin Q 12 for DVT ppx

## 2022-06-24 NOTE — DISCHARGE NOTE PROVIDER - CARE PROVIDER_API CALL
Leslee Modi (DO)  [facility]  [place]  [address], [address]  [place] [address]  Phone: [phone]  Fax: [phone]  Follow Up Time: 1 week    Skyler Raphael)  Critical Care Medicine; Internal Medicine; Pulmonary Disease  [address], [address]  [place] [address]  Phone: [phone]  Fax: [phone]  Follow Up Time: 1 week    Jaime Workman)  Gastroenterology  [address] [place] [address]  Phone: [phone]  Fax: [phone]  Follow Up Time: 1 week   Leslee Modi (DO)  [facility]  [place]  [address], [address]  [place] [address]  Phone: [phone]  Fax: [phone]  Follow Up Time: 1 week    Skyler Raphael)  Critical Care Medicine; Internal Medicine; Pulmonary Disease  [address], [address]  [place] [address]  Phone: [phone]  Fax: [phone]  Follow Up Time: 1 week    Jaime Workman)  Gastroenterology  [address] [place] [address]  Phone: [phone]  Fax: [phone]  Follow Up Time: 1 week    Stella Morejon)  Internal Medicine  [address] [place] [address]  Phone: [phone]  Fax: [phone]  Follow Up Time: 1 week

## 2022-06-24 NOTE — PROGRESS NOTE ADULT - ASSESSMENT
Prerenal azotemia, CKD 3  Hypokalemia: GI losses, Decreased PO intake  Anemia  Colitis  Hyponatremia    Improving sodium levels and potassium levels. Potassium supplementation.   Monitor h/h trend. Transfuse PRBC's PRN. GI follow up. Will follow electrolytes and renal function trend. 
66 y/o F with PMH of HTN, and Dyslipidemia, who presented with 3 weeks history of diarrhea with abdominal pain, describes the diarrhea as pure mucous, starts everyday in the morning and continue for the whole day, no blood in stools, but only on the toilet paper, pain is crampy, diffuse allover the abdomen, max at the LLQ, no associated nausea or vomiting, but reports low grade fever,    abd pain  diarrhea  anemia  EDUARDO  HTN  HLD  Pulm Nodules    renal and GI and ID eval noted  remains on ABX  VS noted  low grade temp    gi eval - eval for colitis - abd pain - diarrhea - febrile illness - stool cx - ova and parasites  [facility] rx regimen - HLD - HTN  EDUARDO - hydration - serial renal indices - replete lytes -   pulm nodule - / cluster described in RML on CT abd - will check dedicated CT chest   pain assessment - serial abd exam  out of bed as tolerated  mobilize  monitor Na levels -   dvt p  w/u in progress  pt is on emp ABX  SBIRT consult  pt reports social drinking - non smoker - denies drug use
Colitis  IVAB  advance diet  outpt colonoscopy
Colitis  clinically improved  can dc home for out fu  augmentin 875 bid x 7 days  advance diet  outpt colonoscopy
68 y/o F with PMH of HTN, and Dyslipidemia, who presented with 3 weeks history of diarrhea with abdominal pain, describes the diarrhea as pure mucous, starts everyday in the morning and continue for the whole day, no blood in stools, but only on the toilet paper, pain is crampy, diffuse allover the abdomen, max at the LLQ, no associated nausea or vomiting, but reports low grade fever,    abd pain  diarrhea  anemia  EDUARDO  HTN  HLD  Pulm Nodules    GI follow up  on IVF  on ABX  VS noted    gi eval - eval for colitis - abd pain - diarrhea - febrile illness - stool cx - ova and parasites  cvs rx regimen - HLD - HTN  EDUARDO - hydration - serial renal indices - replete lytes -   pulm nodule - / cluster described in RML on CT abd - CT chest -   pain assessment - serial abd exam  out of bed as tolerated  mobilize  monitor Na levels -   dvt p  w/u in progress  pt is on emp ABX  SBIRT consult  pt reports social drinking - non smoker - denies drug use  
68 y/o F with PMH of HTN, and Dyslipidemia, presented with abdominal pain & diarrhea.
66 y/o F with PMH of HTN, and Dyslipidemia, presented with abdominal pain & diarrhea.
68 y/o F with PMH of HTN, and Dyslipidemia, presented with abdominal pain & diarrhea.

## 2022-06-24 NOTE — PROGRESS NOTE ADULT - PROBLEM SELECTOR PLAN 2
Renal function improved with IV hydration
ML 2ry to volume contraction in relation to diarrhea  On IVF  Nephro input appreciated
ML 2ry to volume contraction in relation to diarrhea  On IVF  Nephro input appreciated

## 2022-06-24 NOTE — PROGRESS NOTE ADULT - PROVIDER SPECIALTY LIST ADULT
Gastroenterology
Infectious Disease
Infectious Disease
Pulmonology
Nephrology
Pulmonology
Gastroenterology
Nephrology
Hospitalist

## 2022-06-24 NOTE — PROGRESS NOTE ADULT - SUBJECTIVE AND OBJECTIVE BOX
YOSEF SONG is a 67yFemale , patient examined and chart reviewed.     INTERVAL HPI/ OVERNIGHT EVENTS:   Diarrhea better. Tmax 100.1  NAD.    PAST MEDICAL & SURGICAL HISTORY:  HTN (hypertension)  Dyslipidemia  H/O cervical spine surgery        For details regarding the patient's social history, family history, and other miscellaneous elements, please refer the initial infectious diseases consultation and/or the admitting history and physical examination for this admission.      ROS:  CONSTITUTIONAL:  Negative fever or chills  EYES:  Negative  blurry vision or double vision  CARDIOVASCULAR:  Negative for chest pain or palpitations  RESPIRATORY:  Negative for cough, wheezing, or SOB   GASTROINTESTINAL:  Negative for nausea, vomiting, constipation, + abdominal pain diarrhea,  GENITOURINARY:  Negative frequency, urgency or dysuria  NEUROLOGIC:  No headache, confusion, dizziness, lightheadedness  All other systems were reviewed and are negative     ALLERGIES  amoxicillin (Hives; Swelling)      Current inpatient medications :    ANTIBIOTICS/RELEVANT:  piperacillin/tazobactam IVPB.. 3.375 Gram(s) IV Intermittent every 8 hours      acetaminophen     Tablet .. 650 milliGRAM(s) Oral every 6 hours PRN  acetaminophen     Tablet .. 650 milliGRAM(s) Oral every 6 hours PRN  Biotene Dry Mouth Oral Rinse 5 milliLiter(s) Swish and Spit four times a day  dextrose 5% + sodium chloride 0.9% with potassium chloride 20 mEq/L 1000 milliLiter(s) IV Continuous <Continuous>  heparin   Injectable 5000 Unit(s) SubCutaneous every 12 hours  ondansetron Injectable 4 milliGRAM(s) IV Push every 4 hours PRN  oxyCODONE    IR 5 milliGRAM(s) Oral every 4 hours PRN  oxyCODONE    IR 15 milliGRAM(s) Oral three times a day PRN      Objective:    T(C): 36.9 (22 @ 14:23), Max: 37.8 (22 @ 01:29)  HR: 88 (22 @ 14:23) (75 - 91)  BP: 144/88 (22 @ 14:23) (98/62 - 144/88)  RR: 18 (22 @ 14:23) (16 - 18)  SpO2: 98% (22 @ 14:23) (95% - 98%)      Physical Exam:  General: no acute distress  Neck: supple, trachea midline  Lungs: clear, no wheeze/rhonchi  Cardiovascular: regular rate and rhythm, S1 S2  Abdomen: soft, nontender,  bowel sounds normal  Neurological: alert and oriented x3  Skin: no rash  Extremities: no edema      LABS:                        9.0    10.76 )-----------( 353      ( 2022 08:02 )             27.2       06-23    134<L>  |  102  |  9   ----------------------------<  158<H>  3.5   |  26  |  1.07    Ca    8.0<L>      2022 08:02  Phos  2.6     06-23  Mg     1.8     06-23    TPro  5.9<L>  /  Alb  1.9<L>  /  TBili  0.3  /  DBili  x   /  AST  19  /  ALT  11  /  AlkPhos  64  06-23      PT/INR - ( 2022 20:18 )   PT: 13.6 sec;   INR: 1.18 ratio         PTT - ( 2022 20:18 )  PTT:24.7 sec  Urinalysis Basic - ( 2022 02:38 )    Color: Yellow / Appearance: Clear / S.010 / pH: x  Gluc: x / Ketone: Negative  / Bili: Negative / Urobili: Negative mg/dL   Blood: x / Protein: 15 mg/dL / Nitrite: Negative   Leuk Esterase: Negative / RBC: x / WBC 0-2   Sq Epi: x / Non Sq Epi: Occasional / Bacteria: Occasional      MICROBIOLOGY:          RADIOLOGY & ADDITIONAL STUDIES:  ACC: [id number] EXAM:  CT ABDOMEN AND PELVIS                          PROCEDURE DATE:  2022          INTERPRETATION:  CLINICAL INFORMATION: Diarrhea and left lower quadrant   pain.    COMPARISON: None.    CONTRAST/COMPLICATIONS:  IV Contrast: NONE  Oral Contrast: NONE  Complications: None reported at time of study completion    PROCEDURE:  CT of the Abdomen and Pelvis was performed.  Sagittal and coronal reformats were performed.    FINDINGS:    LOWER CHEST: Few partially imaged clustered nodules of the right middle   lobe, images 1-4 of series 3. These findings may be infectious or   inflammatory. Correlate with chest CT. No visualized pleural effusion.    Solid organ evaluation limited due to noncontrast technique.    LIVER: Measures 17.5 cm craniocaudal dimension  BILE DUCTS: No intrahepatic ductal dilatation. Common bile duct is   enlarged for patient age, measuring approximately 1 cm. Correlate with   LFTs.  GALLBLADDER: Contracted  SPLEEN: Spleen size within normal limits  PANCREAS: No acute peripancreatic inflammation  ADRENALS: Unremarkable  KIDNEYS/URETERS: No hydronephrosis    BLADDER: Underdistended  REPRODUCTIVE ORGANS: Myomatous uterus, suboptimally characterized on CT.    BOWEL: Stomach is underdistended. No small bowel distention. The appendix   is nondistended and noninflamed. There is long segment moderate to severe   mural thickening of the left colon and sigmoid colon concerning for   colitis. Adjacent reactive adenopathy is noted. Colonic diverticulosis is   also noted without clear evidence of focal diverticulitis.  PERITONEUM: Trace fluid. No loculated fluid collection or free air.  VESSELS: No aneurysm of the abdominal aorta. Aortoiliac atheromatous   changes.  RETROPERITONEUM/LYMPH NODES: Prominent adenopathy along the course of the   inflamed colon is likely reactive in nature.  ABDOMINAL WALL: Tiny fat-containing umbilical hernia.  BONES: Degenerative changes.    IMPRESSION:    Limited noncontrast study.    Moderate to severe colitis of the left colon and sigmoid colon. Prominent   adjacent adenopathy is likely reactive in nature. No abscess or free air.   Follow to resolution with repeat CT abdomen pelvis in 10-14 days.   Consider colonoscopy once acute symptoms have resolved.    Few partially imaged clustered nodules of the right middle lobe, images   1-4 of series 3. These findings may be infectious or inflammatory.   Correlate with chest CT.    Common bile duct is enlarged for patient age, measuring approximately 1   cm. Correlate with LFTs.    Assessment :   68YO F PMH of HTN, and Dyslipidemia, who presented to the hospital with cc of 3 weeks history of diarrhea with abdominal pain found to have Moderate to severe colitis of the left colon and sigmoid colon.   Fevers better  rule out infectious colitis  rule out Cdiff  ? acute diverticulitis    Plan :   Cont Zosyn  Fu cultures  Fu GI pcr Cdiff  Trend temps and cbc  Serial abd exams  GI on case    D/w Dr Green    Continue with present regiment.  Appropriate use of antibiotics and adverse effects reviewed.      I have discussed the above plan of care with patient in detail. She expressed understanding of the  treatment plan . Risks, benefits and alternatives discussed in detail. I have asked if she has any questions or concerns and appropriately addressed them to the best of my ability .    > 35 minutes were spent in direct patient care reviewing notes, medications ,labs data/ imaging , discussion with multidisciplinary team.    Thank you for allowing me to participate in care of your patient .    Rach Cisneros MD  Infectious Disease  [phone]
INTERVAL HPI/OVERNIGHT EVENTS:  feels better  MEDICATIONS  (STANDING):  Biotene Dry Mouth Oral Rinse 5 milliLiter(s) Swish and Spit four times a day  dextrose 5% + sodium chloride 0.9% with potassium chloride 20 mEq/L 1000 milliLiter(s) (75 mL/Hr) IV Continuous <Continuous>  heparin   Injectable 5000 Unit(s) SubCutaneous every 12 hours  piperacillin/tazobactam IVPB.. 3.375 Gram(s) IV Intermittent every 8 hours  potassium chloride    Tablet ER 40 milliEquivalent(s) Oral once    MEDICATIONS  (PRN):  acetaminophen     Tablet .. 650 milliGRAM(s) Oral every 6 hours PRN Mild Pain (1 - 3)  acetaminophen     Tablet .. 650 milliGRAM(s) Oral every 6 hours PRN Temp greater or equal to 38C (100.4F)  oxyCODONE    IR 5 milliGRAM(s) Oral every 4 hours PRN Moderate Pain (4 - 6)  oxyCODONE    IR 15 milliGRAM(s) Oral three times a day PRN Severe Pain (7 - 10)      Allergies    amoxicillin (Hives; Swelling)    Intolerances        Review of Systems:    General:  No wt loss, fevers, chills, night sweats,fatigue,   Eyes:  Good vision, no reported pain  ENT:  No sore throat, pain, runny nose, dysphagia  CV:  No pain, palpitatioins, hypo/hypertension  Resp:  No dyspnea, cough, tachypnea, wheezing  GI:  No pain, No nausea, No vomiting, No diarrhea, No constipatiion, No weight loss, No fever, No pruritis, No rectal bleeding, No tarry stools, No dysphagia,  :  No pain, bleeding, incontinence, nocturia  Muscle:  No pain, weakness  Neuro:  No weakness, tingling, memory problems  Psych:  No fatigue, insomnia, mood problems, depression  Endocrine:  No polyuria, polydypsia, cold/heat intolerance  Heme:  No petechiae, ecchymosis, easy bruisability  Skin:  No rash, tattoos, scars, edema      Vital Signs Last 24 Hrs  T(C): 36.7 (24 Jun 2022 05:43), Max: 37.1 (23 Jun 2022 18:11)  T(F): 98 (24 Jun 2022 05:43), Max: 98.7 (23 Jun 2022 18:11)  HR: 87 (24 Jun 2022 05:43) (74 - 88)  BP: 126/64 (24 Jun 2022 05:43) (112/75 - 144/88)  BP(mean): --  RR: 18 (24 Jun 2022 05:43) (17 - 18)  SpO2: 98% (24 Jun 2022 05:43) (97% - 98%)    PHYSICAL EXAM:    Constitutional: NAD, well-developed  HEENT: EOMI, throat clear  Neck: No LAD, supple  Respiratory: CTA and P  Cardiovascular: S1 and S2, RRR, no M  Gastrointestinal: BS+, soft, NT/ND, neg HSM,  Extremities: No peripheral edema, neg clubing, cyanosis  Vascular: 2+ peripheral pulses  Neurological: A/O x 3, no focal deficits  Psychiatric: Normal mood, normal affect  Skin: No rashes      LABS:                        9.0    7.77  )-----------( 348      ( 24 Jun 2022 08:08 )             28.4     06-24    141  |  107  |  7   ----------------------------<  138<H>  3.4<L>   |  24  |  0.97    Ca    8.4      24 Jun 2022 08:08  Phos  2.6     06-23  Mg     1.8     06-23    TPro  5.9<L>  /  Alb  1.9<L>  /  TBili  0.3  /  DBili  x   /  AST  19  /  ALT  11  /  AlkPhos  64  06-23          RADIOLOGY & ADDITIONAL TESTS:  
INTERVAL HPI/OVERNIGHT EVENTS:  improved  MEDICATIONS  (STANDING):  Biotene Dry Mouth Oral Rinse 5 milliLiter(s) Swish and Spit four times a day  dextrose 5% + sodium chloride 0.9% with potassium chloride 20 mEq/L 1000 milliLiter(s) (75 mL/Hr) IV Continuous <Continuous>  heparin   Injectable 5000 Unit(s) SubCutaneous every 12 hours  piperacillin/tazobactam IVPB.. 3.375 Gram(s) IV Intermittent every 8 hours  potassium chloride    Tablet ER 40 milliEquivalent(s) Oral once    MEDICATIONS  (PRN):  acetaminophen     Tablet .. 650 milliGRAM(s) Oral every 6 hours PRN Mild Pain (1 - 3)  acetaminophen     Tablet .. 650 milliGRAM(s) Oral every 6 hours PRN Temp greater or equal to 38C (100.4F)  ondansetron Injectable 4 milliGRAM(s) IV Push every 4 hours PRN Nausea and/or Vomiting  oxyCODONE    IR 5 milliGRAM(s) Oral every 4 hours PRN Moderate Pain (4 - 6)  oxyCODONE    IR 15 milliGRAM(s) Oral three times a day PRN Severe Pain (7 - 10)      Allergies    amoxicillin (Hives; Swelling)    Intolerances        Review of Systems:    General:  No wt loss, fevers, chills, night sweats,fatigue,   Eyes:  Good vision, no reported pain  ENT:  No sore throat, pain, runny nose, dysphagia  CV:  No pain, palpitatioins, hypo/hypertension  Resp:  No dyspnea, cough, tachypnea, wheezing  GI:  No pain, No nausea, No vomiting, No diarrhea, No constipatiion, No weight loss, No fever, No pruritis, No rectal bleeding, No tarry stools, No dysphagia,  :  No pain, bleeding, incontinence, nocturia  Muscle:  No pain, weakness  Neuro:  No weakness, tingling, memory problems  Psych:  No fatigue, insomnia, mood problems, depression  Endocrine:  No polyuria, polydypsia, cold/heat intolerance  Heme:  No petechiae, ecchymosis, easy bruisability  Skin:  No rash, tattoos, scars, edema      Vital Signs Last 24 Hrs  T(C): 36.9 (2022 14:23), Max: 37.8 (2022 01:29)  T(F): 98.4 (2022 14:23), Max: 100.1 (2022 01:29)  HR: 88 (2022 14:23) (75 - 91)  BP: 144/88 (2022 14:23) (98/62 - 144/88)  BP(mean): --  RR: 18 (2022 14:23) (16 - 18)  SpO2: 98% (2022 14:23) (95% - 98%)    PHYSICAL EXAM:    Constitutional: NAD, well-developed  HEENT: EOMI, throat clear  Neck: No LAD, supple  Respiratory: CTA and P  Cardiovascular: S1 and S2, RRR, no M  Gastrointestinal: BS+, soft, NT/ND, neg HSM,  Extremities: No peripheral edema, neg clubing, cyanosis  Vascular: 2+ peripheral pulses  Neurological: A/O x 3, no focal deficits  Psychiatric: Normal mood, normal affect  Skin: No rashes      LABS:                        9.0    10.76 )-----------( 353      ( 2022 08:02 )             27.2     06-23    134<L>  |  102  |  9   ----------------------------<  158<H>  3.5   |  26  |  1.07    Ca    8.0<L>      2022 08:02  Phos  2.6     06-23  Mg     1.8     06-23    TPro  5.9<L>  /  Alb  1.9<L>  /  TBili  0.3  /  DBili  x   /  AST  19  /  ALT  11  /  AlkPhos  64  06-23    PT/INR - ( 2022 20:18 )   PT: 13.6 sec;   INR: 1.18 ratio         PTT - ( 2022 20:18 )  PTT:24.7 sec  Urinalysis Basic - ( 2022 02:38 )    Color: Yellow / Appearance: Clear / S.010 / pH: x  Gluc: x / Ketone: Negative  / Bili: Negative / Urobili: Negative mg/dL   Blood: x / Protein: 15 mg/dL / Nitrite: Negative   Leuk Esterase: Negative / RBC: x / WBC 0-2   Sq Epi: x / Non Sq Epi: Occasional / Bacteria: Occasional        RADIOLOGY & ADDITIONAL TESTS:  
Patient is a 67y old  Female who presents with a chief complaint of Noninfectious gastroenteritis     (2022 13:45)    Patient seen in follow up for       PAST MEDICAL HISTORY:  HTN (hypertension)    Dyslipidemia      MEDICATIONS  (STANDING):  Biotene Dry Mouth Oral Rinse 5 milliLiter(s) Swish and Spit four times a day  dextrose 5% + sodium chloride 0.9% with potassium chloride 20 mEq/L 1000 milliLiter(s) (75 mL/Hr) IV Continuous <Continuous>  heparin   Injectable 5000 Unit(s) SubCutaneous every 12 hours  piperacillin/tazobactam IVPB.. 3.375 Gram(s) IV Intermittent every 8 hours    MEDICATIONS  (PRN):  acetaminophen     Tablet .. 650 milliGRAM(s) Oral every 6 hours PRN Mild Pain (1 - 3)  acetaminophen     Tablet .. 650 milliGRAM(s) Oral every 6 hours PRN Temp greater or equal to 38C (100.4F)  ondansetron Injectable 4 milliGRAM(s) IV Push every 4 hours PRN Nausea and/or Vomiting  oxyCODONE    IR 5 milliGRAM(s) Oral every 4 hours PRN Moderate Pain (4 - 6)  oxyCODONE    IR 15 milliGRAM(s) Oral three times a day PRN Severe Pain (7 - 10)    T(C): 36.9 (22 @ 14:23), Max: 37.8 (22 @ 01:29)  HR: 88 (22 @ 14:23) (71 - 91)  BP: 144/88 (22 @ 14:23) (93/58 - 144/88)  RR: 18 (22 @ 14:23) (16 - 18)  SpO2: 98% (22 @ 14:23) (95% - 100%)  Wt(kg): --  I&O's Detail      PHYSICAL EXAM:  General: No distress  Respiratory: b/l air entry  Cardiovascular: S1 S2  Gastrointestinal: soft  Extremities:  edema                              9.0    10.76 )-----------( 353      ( 2022 08:02 )             27.2     -    134<L>  |  102  |  9   ----------------------------<  158<H>  3.5   |  26  |  1.07    Ca    8.0<L>      2022 08:02  Phos  2.6       Mg     1.8         TPro  5.9<L>  /  Alb  1.9<L>  /  TBili  0.3  /  DBili  x   /  AST  19  /  ALT  11  /  AlkPhos  64          LIVER FUNCTIONS - ( 2022 08:02 )  Alb: 1.9 g/dL / Pro: 5.9 g/dL / ALK PHOS: 64 U/L / ALT: 11 U/L DA / AST: 19 U/L / GGT: x           Urinalysis Basic - ( 2022 02:38 )    Color: Yellow / Appearance: Clear / S.010 / pH: x  Gluc: x / Ketone: Negative  / Bili: Negative / Urobili: Negative mg/dL   Blood: x / Protein: 15 mg/dL / Nitrite: Negative   Leuk Esterase: Negative / RBC: x / WBC 0-2   Sq Epi: x / Non Sq Epi: Occasional / Bacteria: Occasional        Sodium, Serum: 134 ( @ 08:02)  Sodium, Serum: 132 ( @ 06:00)  Sodium, Serum: 130 ( @ 20:18)  Sodium, Serum: 127 ( @ 16:38)    Creatinine, Serum: 1.07 ( @ 08:02)  Creatinine, Serum: 1.43 ( @ 06:00)  Creatinine, Serum: 1.35 ( @ 20:18)  Creatinine, Serum: 1.56 ( @ 16:38)    Potassium, Serum: 3.5 ( @ 08:02)  Potassium, Serum: 2.7 ( @ 06:00)  Potassium, Serum: 3.0 ( @ 20:18)  Potassium, Serum: 2.5 ( @ 16:38)    Hemoglobin: 9.0 ( @ 08:02)  Hemoglobin: 8.9 ( @ 06:00)  Hemoglobin: 10.7 ( @ 16:38)    
Subjective:  resolving diarrhea.     MEDICATIONS  (STANDING):  Biotene Dry Mouth Oral Rinse 5 milliLiter(s) Swish and Spit four times a day  heparin   Injectable 5000 Unit(s) SubCutaneous every 12 hours  piperacillin/tazobactam IVPB.. 3.375 Gram(s) IV Intermittent every 8 hours  potassium chloride    Tablet ER 40 milliEquivalent(s) Oral once    MEDICATIONS  (PRN):  acetaminophen     Tablet .. 650 milliGRAM(s) Oral every 6 hours PRN Mild Pain (1 - 3)  acetaminophen     Tablet .. 650 milliGRAM(s) Oral every 6 hours PRN Temp greater or equal to 38C (100.4F)  oxyCODONE    IR 15 milliGRAM(s) Oral three times a day PRN Severe Pain (7 - 10)          T(C): 36.8 (06-24-22 @ 10:43), Max: 37.1 (06-23-22 @ 18:11)  HR: 73 (06-24-22 @ 10:43) (73 - 88)  BP: 106/66 (06-24-22 @ 10:43) (106/66 - 144/88)  RR: 18 (06-24-22 @ 10:43) (17 - 18)  SpO2: 96% (06-24-22 @ 10:43) (96% - 98%)  Wt(kg): --        I&O's Detail    23 Jun 2022 07:01  -  24 Jun 2022 07:00  --------------------------------------------------------  IN:    dextrose 5% + sodium chloride 0.9% w/ Additives: 800 mL  Total IN: 800 mL    OUT:  Total OUT: 0 mL    Total NET: 800 mL               PHYSICAL EXAM:    GENERAL: NAD  NECK: Supple, no inc in JVP  CHEST/LUNG: Clear  HEART: S1S2  ABDOMEN: Soft, mild LLQ tenderness.   EXTREMITIES:  no edema      LABS:  CBC Full  -  ( 24 Jun 2022 08:08 )  WBC Count : 7.77 K/uL  RBC Count : 3.12 M/uL  Hemoglobin : 9.0 g/dL  Hematocrit : 28.4 %  Platelet Count - Automated : 348 K/uL  Mean Cell Volume : 91.0 fl  Mean Cell Hemoglobin : 28.8 pg  Mean Cell Hemoglobin Concentration : 31.7 gm/dL  Auto Neutrophil # : x  Auto Lymphocyte # : x  Auto Monocyte # : x  Auto Eosinophil # : x  Auto Basophil # : x  Auto Neutrophil % : x  Auto Lymphocyte % : x  Auto Monocyte % : x  Auto Eosinophil % : x  Auto Basophil % : x    06-24    141  |  107  |  7   ----------------------------<  138<H>  3.4<L>   |  24  |  0.97    Ca    8.4      24 Jun 2022 08:08  Phos  2.6     06-23  Mg     1.8     06-23    TPro  5.9<L>  /  Alb  1.9<L>  /  TBili  0.3  /  DBili  x   /  AST  19  /  ALT  11  /  AlkPhos  64  06-23          Impression:  * Pre-renal EDUARDO resolved.   * Hypovolemic hypoNa reolved.   * HypoK improving  * Colitis    Recommendations:   KCL supplement ordered.   Follow repeat Cr, K in 24h.           
     YOSEF SONG is a 67yFemale , patient examined and chart reviewed.     INTERVAL HPI/ OVERNIGHT EVENTS:   Diarrhea resolved.   NAD.    PAST MEDICAL & SURGICAL HISTORY:  HTN (hypertension)  Dyslipidemia  H/O cervical spine surgery        For details regarding the patient's social history, family history, and other miscellaneous elements, please refer the initial infectious diseases consultation and/or the admitting history and physical examination for this admission.      ROS:  CONSTITUTIONAL:  Negative fever or chills  EYES:  Negative  blurry vision or double vision  CARDIOVASCULAR:  Negative for chest pain or palpitations  RESPIRATORY:  Negative for cough, wheezing, or SOB   GASTROINTESTINAL:  Negative for nausea, vomiting, constipation,abdominal pain diarrhea  GENITOURINARY:  Negative frequency, urgency or dysuria  NEUROLOGIC:  No headache, confusion, dizziness, lightheadedness  All other systems were reviewed and are negative     ALLERGIES  amoxicillin (Hives; Swelling)      Current inpatient medications :    ANTIBIOTICS/RELEVANT:  piperacillin/tazobactam IVPB.. 3.375 Gram(s) IV Intermittent every 8 hours    MEDICATIONS  (STANDING):  Biotene Dry Mouth Oral Rinse 5 milliLiter(s) Swish and Spit four times a day  heparin   Injectable 5000 Unit(s) SubCutaneous every 12 hours  potassium chloride    Tablet ER 40 milliEquivalent(s) Oral once    MEDICATIONS  (PRN):  acetaminophen     Tablet .. 650 milliGRAM(s) Oral every 6 hours PRN Mild Pain (1 - 3)  acetaminophen     Tablet .. 650 milliGRAM(s) Oral every 6 hours PRN Temp greater or equal to 38C (100.4F)  oxyCODONE    IR 15 milliGRAM(s) Oral three times a day PRN Severe Pain (7 - 10)        Objective:  Vital Signs Last 24 Hrs  T(C): 36.8 (2022 10:43), Max: 37.1 (2022 18:11)  T(F): 98.2 (2022 10:43), Max: 98.7 (2022 18:11)  HR: 73 (2022 10:43) (73 - 88)  BP: 106/66 (2022 10:43) (106/66 - 144/88)  RR: 18 (2022 10:43) (17 - 18)  SpO2: 96% (2022 10:43) (96% - 98%)    Physical Exam:  General: no acute distress  Neck: supple, trachea midline  Lungs: clear, no wheeze/rhonchi  Cardiovascular: regular rate and rhythm, S1 S2  Abdomen: soft, nontender,  bowel sounds normal  Neurological: alert and oriented x3  Skin: no rash  Extremities: no edema      LABS:                        9.0    7.77  )-----------( 348      ( 2022 08:08 )             28.4   06-24    141  |  107  |  7   ----------------------------<  138<H>  3.4<L>   |  24  |  0.97    Ca    8.4      2022 08:08  Phos  2.6     06-  Mg     1.8     06-    TPro  5.9<L>  /  Alb  1.9<L>  /  TBili  0.3  /  DBili  x   /  AST  19  /  ALT  11  /  AlkPhos  64  06-    Urinalysis Basic - ( 2022 02:38 )    Color: Yellow / Appearance: Clear / S.010 / pH: x  Gluc: x / Ketone: Negative  / Bili: Negative / Urobili: Negative mg/dL   Blood: x / Protein: 15 mg/dL / Nitrite: Negative   Leuk Esterase: Negative / RBC: x / WBC 0-2   Sq Epi: x / Non Sq Epi: Occasional / Bacteria: Occasional      MICROBIOLOGY:  Clostridium difficile Toxin by PCR (22 @ 06:00)  C Diff by PCR Result: NotDete        RADIOLOGY & ADDITIONAL STUDIES:  ACC: [id number] EXAM:  CT ABDOMEN AND PELVIS                          PROCEDURE DATE:  2022          INTERPRETATION:  CLINICAL INFORMATION: Diarrhea and left lower quadrant   pain.    COMPARISON: None.    CONTRAST/COMPLICATIONS:  IV Contrast: NONE  Oral Contrast: NONE  Complications: None reported at time of study completion    PROCEDURE:  CT of the Abdomen and Pelvis was performed.  Sagittal and coronal reformats were performed.    FINDINGS:    LOWER CHEST: Few partially imaged clustered nodules of the right middle   lobe, images 1-4 of series 3. These findings may be infectious or   inflammatory. Correlate with chest CT. No visualized pleural effusion.    Solid organ evaluation limited due to noncontrast technique.    LIVER: Measures 17.5 cm craniocaudal dimension  BILE DUCTS: No intrahepatic ductal dilatation. Common bile duct is   enlarged for patient age, measuring approximately 1 cm. Correlate with   LFTs.  GALLBLADDER: Contracted  SPLEEN: Spleen size within normal limits  PANCREAS: No acute peripancreatic inflammation  ADRENALS: Unremarkable  KIDNEYS/URETERS: No hydronephrosis    BLADDER: Underdistended  REPRODUCTIVE ORGANS: Myomatous uterus, suboptimally characterized on CT.    BOWEL: Stomach is underdistended. No small bowel distention. The appendix   is nondistended and noninflamed. There is long segment moderate to severe   mural thickening of the left colon and sigmoid colon concerning for   colitis. Adjacent reactive adenopathy is noted. Colonic diverticulosis is   also noted without clear evidence of focal diverticulitis.  PERITONEUM: Trace fluid. No loculated fluid collection or free air.  VESSELS: No aneurysm of the abdominal aorta. Aortoiliac atheromatous   changes.  RETROPERITONEUM/LYMPH NODES: Prominent adenopathy along the course of the   inflamed colon is likely reactive in nature.  ABDOMINAL WALL: Tiny fat-containing umbilical hernia.  BONES: Degenerative changes.    IMPRESSION:    Limited noncontrast study.    Moderate to severe colitis of the left colon and sigmoid colon. Prominent   adjacent adenopathy is likely reactive in nature. No abscess or free air.   Follow to resolution with repeat CT abdomen pelvis in 10-14 days.   Consider colonoscopy once acute symptoms have resolved.    Few partially imaged clustered nodules of the right middle lobe, images   1-4 of series 3. These findings may be infectious or inflammatory.   Correlate with chest CT.    Common bile duct is enlarged for patient age, measuring approximately 1   cm. Correlate with LFTs.    Assessment :   68YO F PMH of HTN, and Dyslipidemia, who presented to the hospital with cc of 3 weeks history of diarrhea with abdominal pain found to have Moderate to severe colitis of the left colon and sigmoid colon.   Sp fever  rule out infectious colitis  Cdiff ruled out  ? acute diverticulitis  Wbc normalised  EDUARDO resolved  Clinically improved    Plan :   On Zosyn  Can change to po Vantin Flagyl x 5 days for dc  Fu GI pcr  Trend temps and cbc  Serial abd exams  GI on case  Stable from ID standpoint    D/w Dr Green    Continue with present regiment.  Appropriate use of antibiotics and adverse effects reviewed.      I have discussed the above plan of care with patient in detail. She expressed understanding of the  treatment plan . Risks, benefits and alternatives discussed in detail. I have asked if she has any questions or concerns and appropriately addressed them to the best of my ability .    > 35 minutes were spent in direct patient care reviewing notes, medications ,labs data/ imaging , discussion with multidisciplinary team.    Thank you for allowing me to participate in care of your patient .    Rach Cisneros MD  Infectious Disease  [phone]
Date/Time Patient Seen:  		  Referring MD:   Data Reviewed	       Patient is a 67y old  Female who presents with a chief complaint of Abdominal pain & diarrhea. (23 Jun 2022 17:27)      Subjective/HPI     PAST MEDICAL & SURGICAL HISTORY:  HTN (hypertension)    Dyslipidemia    H/O cervical spine surgery          Medication list         MEDICATIONS  (STANDING):  Biotene Dry Mouth Oral Rinse 5 milliLiter(s) Swish and Spit four times a day  dextrose 5% + sodium chloride 0.9% with potassium chloride 20 mEq/L 1000 milliLiter(s) (75 mL/Hr) IV Continuous <Continuous>  heparin   Injectable 5000 Unit(s) SubCutaneous every 12 hours  piperacillin/tazobactam IVPB.. 3.375 Gram(s) IV Intermittent every 8 hours    MEDICATIONS  (PRN):  acetaminophen     Tablet .. 650 milliGRAM(s) Oral every 6 hours PRN Mild Pain (1 - 3)  acetaminophen     Tablet .. 650 milliGRAM(s) Oral every 6 hours PRN Temp greater or equal to 38C (100.4F)  ondansetron Injectable 4 milliGRAM(s) IV Push every 4 hours PRN Nausea and/or Vomiting  oxyCODONE    IR 5 milliGRAM(s) Oral every 4 hours PRN Moderate Pain (4 - 6)  oxyCODONE    IR 15 milliGRAM(s) Oral three times a day PRN Severe Pain (7 - 10)         Vitals log        ICU Vital Signs Last 24 Hrs  T(C): 36.7 (24 Jun 2022 05:43), Max: 37.1 (23 Jun 2022 18:11)  T(F): 98 (24 Jun 2022 05:43), Max: 98.7 (23 Jun 2022 18:11)  HR: 87 (24 Jun 2022 05:43) (74 - 88)  BP: 126/64 (24 Jun 2022 05:43) (112/75 - 144/88)  BP(mean): --  ABP: --  ABP(mean): --  RR: 18 (24 Jun 2022 05:43) (17 - 18)  SpO2: 98% (24 Jun 2022 05:43) (97% - 98%)           Input and Output:  I&O's Detail    23 Jun 2022 07:01  -  24 Jun 2022 06:06  --------------------------------------------------------  IN:    dextrose 5% + sodium chloride 0.9% w/ Additives: 800 mL  Total IN: 800 mL    OUT:  Total OUT: 0 mL    Total NET: 800 mL          Lab Data                        9.0    10.76 )-----------( 353      ( 23 Jun 2022 08:02 )             27.2     06-23    134<L>  |  102  |  9   ----------------------------<  158<H>  3.5   |  26  |  1.07    Ca    8.0<L>      23 Jun 2022 08:02  Phos  2.6     06-23  Mg     1.8     06-23    TPro  5.9<L>  /  Alb  1.9<L>  /  TBili  0.3  /  DBili  x   /  AST  19  /  ALT  11  /  AlkPhos  64  06-23            Review of Systems	      Objective     Physical Examination    heart s1s2  lung dec BS  abd soft      Pertinent Lab findings & Imaging      Sylvain:  NO   Adequate UO     I&O's Detail    23 Jun 2022 07:01  -  24 Jun 2022 06:06  --------------------------------------------------------  IN:    dextrose 5% + sodium chloride 0.9% w/ Additives: 800 mL  Total IN: 800 mL    OUT:  Total OUT: 0 mL    Total NET: 800 mL               Discussed with:     Cultures:	        Radiology                            
Date/Time Patient Seen:  		  Referring MD:   Data Reviewed	       Patient is a 67y old  Female who presents with a chief complaint of Abdominal pain & diarrhea. (22 Jun 2022 12:34)      Subjective/HPI     PAST MEDICAL & SURGICAL HISTORY:  HTN (hypertension)    Dyslipidemia    H/O cervical spine surgery          Medication list         MEDICATIONS  (STANDING):  Biotene Dry Mouth Oral Rinse 5 milliLiter(s) Swish and Spit four times a day  dextrose 5% + sodium chloride 0.9% with potassium chloride 20 mEq/L 1000 milliLiter(s) (75 mL/Hr) IV Continuous <Continuous>  heparin   Injectable 5000 Unit(s) SubCutaneous every 12 hours  piperacillin/tazobactam IVPB.. 3.375 Gram(s) IV Intermittent every 8 hours    MEDICATIONS  (PRN):  acetaminophen     Tablet .. 650 milliGRAM(s) Oral every 6 hours PRN Mild Pain (1 - 3)  acetaminophen     Tablet .. 650 milliGRAM(s) Oral every 6 hours PRN Temp greater or equal to 38C (100.4F)  ondansetron Injectable 4 milliGRAM(s) IV Push every 4 hours PRN Nausea and/or Vomiting  oxyCODONE    IR 5 milliGRAM(s) Oral every 4 hours PRN Moderate Pain (4 - 6)  oxyCODONE    IR 15 milliGRAM(s) Oral three times a day PRN Severe Pain (7 - 10)         Vitals log        ICU Vital Signs Last 24 Hrs  T(C): 36.9 (23 Jun 2022 05:32), Max: 37.8 (23 Jun 2022 01:29)  T(F): 98.5 (23 Jun 2022 05:32), Max: 100.1 (23 Jun 2022 01:29)  HR: 91 (23 Jun 2022 05:32) (71 - 91)  BP: 127/72 (23 Jun 2022 05:32) (93/58 - 127/72)  BP(mean): --  ABP: --  ABP(mean): --  RR: 17 (23 Jun 2022 05:32) (16 - 17)  SpO2: 98% (23 Jun 2022 05:32) (95% - 100%)           Input and Output:  I&O's Detail      Lab Data                        8.9    9.76  )-----------( 340      ( 22 Jun 2022 06:00 )             27.2     06-22    132<L>  |  97  |  12  ----------------------------<  162<H>  2.7<LL>   |  27  |  1.43<H>    Ca    8.2<L>      22 Jun 2022 06:00  Phos  3.0     06-22  Mg     1.9     06-22    TPro  5.7<L>  /  Alb  2.0<L>  /  TBili  0.4  /  DBili  x   /  AST  18  /  ALT  16  /  AlkPhos  57  06-22            Review of Systems	      Objective     Physical Examination    heart s1s2  lung dec BS  abd soft      Pertinent Lab findings & Imaging      Sylvain:  NO   Adequate UO     I&O's Detail           Discussed with:     Cultures:	        Radiology                            
Patient is a 67y old  Female who presents with a chief complaint of Abdominal pain & diarrhea. (2022 06:10)      INTERVAL HPI/OVERNIGHT EVENTS: Patient seen and examined at bedside. No overnight events. Diarrhea improved this morning, looking forward to advancing diet     MEDICATIONS  (STANDING):  Biotene Dry Mouth Oral Rinse 5 milliLiter(s) Swish and Spit four times a day  dextrose 5% + sodium chloride 0.9% with potassium chloride 20 mEq/L 1000 milliLiter(s) (75 mL/Hr) IV Continuous <Continuous>  heparin   Injectable 5000 Unit(s) SubCutaneous every 12 hours  piperacillin/tazobactam IVPB.. 3.375 Gram(s) IV Intermittent every 8 hours    MEDICATIONS  (PRN):  acetaminophen     Tablet .. 650 milliGRAM(s) Oral every 6 hours PRN Mild Pain (1 - 3)  acetaminophen     Tablet .. 650 milliGRAM(s) Oral every 6 hours PRN Temp greater or equal to 38C (100.4F)  ondansetron Injectable 4 milliGRAM(s) IV Push every 4 hours PRN Nausea and/or Vomiting  oxyCODONE    IR 5 milliGRAM(s) Oral every 4 hours PRN Moderate Pain (4 - 6)  oxyCODONE    IR 15 milliGRAM(s) Oral three times a day PRN Severe Pain (7 - 10)      Allergies    amoxicillin (Hives; Swelling)    Intolerances        REVIEW OF SYSTEMS:  CONSTITUTIONAL: No fever or chills  HEENT:  No headache, no sore throat  RESPIRATORY: No cough, wheezing, or shortness of breath  CARDIOVASCULAR: No chest pain, palpitations, or leg swelling  GASTROINTESTINAL: Admits mild abd pain, nausea, vomiting. Diarrhea improved   GENITOURINARY: No dysuria, frequency, or hematuria  NEUROLOGICAL: no focal weakness or dizziness  MUSCULOSKELETAL: no myalgias       Vital Signs Last 24 Hrs  T(C): 36.9 (2022 10:19), Max: 37.8 (2022 01:29)  T(F): 98.4 (2022 10:19), Max: 100.1 (2022 01:29)  HR: 75 (2022 10:19) (71 - 91)  BP: 112/75 (2022 10:19) (93/58 - 127/72)  BP(mean): --  RR: 18 (2022 10:19) (16 - 18)  SpO2: 98% (2022 10:19) (95% - 100%)    PHYSICAL EXAM:  GENERAL: NAD, appears older than stated age  HEENT:  NCAT, moist mucous membranes  CHEST/LUNG:  CTA b/l, no rales, wheezes, or rhonchi, breathing comfortably on room air  HEART:  RRR, S1, S2  ABDOMEN:  BS+, soft, nondistended, mildly tender to deep palpation   EXTREMITIES: no edema, cyanosis, or calf tenderness  NERVOUS SYSTEM: AA&Ox3, sensation intact, anxious    LABS:                        9.0    10.76 )-----------( 353      ( 2022 08:02 )             27.2     CBC Full  -  ( 2022 08:02 )  WBC Count : 10.76 K/uL  Hemoglobin : 9.0 g/dL  Hematocrit : 27.2 %  Platelet Count - Automated : 353 K/uL  Mean Cell Volume : 89.8 fl  Mean Cell Hemoglobin : 29.7 pg  Mean Cell Hemoglobin Concentration : 33.1 gm/dL  Auto Neutrophil # : 8.81 K/uL  Auto Lymphocyte # : 0.72 K/uL  Auto Monocyte # : 1.07 K/uL  Auto Eosinophil # : 0.02 K/uL  Auto Basophil # : 0.04 K/uL  Auto Neutrophil % : 81.9 %  Auto Lymphocyte % : 6.7 %  Auto Monocyte % : 9.9 %  Auto Eosinophil % : 0.2 %  Auto Basophil % : 0.4 %    2022 08:02    134    |  102    |  9      ----------------------------<  158    3.5     |  26     |  1.07     Ca    8.0        2022 08:02  Phos  2.6       2022 08:02  Mg     1.8       2022 08:02    TPro  5.9    /  Alb  1.9    /  TBili  0.3    /  DBili  x      /  AST  19     /  ALT  11     /  AlkPhos  64     2022 08:02    PT/INR - ( 2022 20:18 )   PT: 13.6 sec;   INR: 1.18 ratio         PTT - ( 2022 20:18 )  PTT:24.7 sec  Urinalysis Basic - ( 2022 02:38 )    Color: Yellow / Appearance: Clear / S.010 / pH: x  Gluc: x / Ketone: Negative  / Bili: Negative / Urobili: Negative mg/dL   Blood: x / Protein: 15 mg/dL / Nitrite: Negative   Leuk Esterase: Negative / RBC: x / WBC 0-2   Sq Epi: x / Non Sq Epi: Occasional / Bacteria: Occasional      CAPILLARY BLOOD GLUCOSE              RADIOLOGY & ADDITIONAL TESTS: < from: CT Abdomen and Pelvis No Cont (22 @ 16:25) >    ACC: [id number] EXAM:  CT ABDOMEN AND PELVIS                          PROCEDURE DATE:  2022          INTERPRETATION:  CLINICAL INFORMATION: Diarrhea and left lower quadrant   pain.    COMPARISON: None.    CONTRAST/COMPLICATIONS:  IV Contrast: NONE  Oral Contrast: NONE  Complications: None reported at time of study completion    PROCEDURE:  CT of the Abdomen and Pelvis was performed.  Sagittal and coronal reformats were performed.    FINDINGS:    LOWER CHEST: Few partially imaged clustered nodules of the right middle   lobe, images 1-4 of series 3. These findings may be infectious or   inflammatory. Correlate with chest CT. No visualized pleural effusion.    Solid organ evaluation limited due to noncontrast technique.    LIVER: Measures 17.5 cm craniocaudal dimension  BILE DUCTS: No intrahepatic ductal dilatation. Common bile duct is   enlarged for patient age, measuring approximately 1 cm. Correlate with   LFTs.  GALLBLADDER: Contracted  SPLEEN: Spleen size within normal limits  PANCREAS: No acute peripancreatic inflammation  ADRENALS: Unremarkable  KIDNEYS/URETERS: No hydronephrosis    BLADDER: Underdistended  REPRODUCTIVE ORGANS: Myomatous uterus, suboptimally characterized on CT.    BOWEL: Stomach is underdistended. No small bowel distention. The appendix   is nondistended and noninflamed. There is long segment moderate to severe   mural thickening of the left colon and sigmoid colon concerning for   colitis. Adjacent reactive adenopathy is noted. Colonic diverticulosis is   also noted without clear evidence of focal diverticulitis.  PERITONEUM: Trace fluid. No loculated fluid collection or free air.  VESSELS: No aneurysm of the abdominal aorta. Aortoiliac atheromatous   changes.  RETROPERITONEUM/LYMPH NODES: Prominent adenopathy along the course of the   inflamed colon is likely reactive in nature.  ABDOMINAL WALL: Tiny fat-containing umbilical hernia.  BONES: Degenerative changes.    IMPRESSION:    Limited noncontrast study.    Moderate to severe colitis of the left colon and sigmoid colon. Prominent   adjacent adenopathy is likely reactive in nature. No abscess or free air.   Follow to resolution with repeat CT abdomen pelvis in 10-14 days.   Consider colonoscopy once acute symptoms have resolved.    Few partially imaged clustered nodules of the right middle lobe, images   1-4 of series 3. These findings may be infectious or inflammatory.   Correlate with chest CT.    Common bile duct is enlarged for patient age, measuring approximately 1   cm. Correlate with LFTs.    --- End of Report ---            SHERON RODRIGUEZ M.D., ATTENDING RADIOLOGIST  This document has been electronically signed. 2022  4:45PM    < end of copied text >      Consultant(s) Notes Reviewed:  [x] YES  [ ] NO    Care Discussed with [x] Consultants  [x] Patient  [ ] Family  [ ]      [ x] Other; RN  DVT ppx  
Patient is a 67y old  Female who presents with a chief complaint of Abdominal pain & diarrhea. (2022 09:48)      INTERVAL HPI/OVERNIGHT EVENTS: Patient seen and examined at bedside. No overnight events. Still nauseous with diarrhea    MEDICATIONS  (STANDING):  Biotene Dry Mouth Oral Rinse 5 milliLiter(s) Swish and Spit four times a day  dextrose 5% + sodium chloride 0.9% with potassium chloride 20 mEq/L 1000 milliLiter(s) (75 mL/Hr) IV Continuous <Continuous>  heparin   Injectable 5000 Unit(s) SubCutaneous every 8 hours  piperacillin/tazobactam IVPB.. 3.375 Gram(s) IV Intermittent every 8 hours    MEDICATIONS  (PRN):  acetaminophen     Tablet .. 650 milliGRAM(s) Oral every 6 hours PRN Mild Pain (1 - 3)  acetaminophen     Tablet .. 650 milliGRAM(s) Oral every 6 hours PRN Temp greater or equal to 38C (100.4F)  ondansetron Injectable 4 milliGRAM(s) IV Push every 4 hours PRN Nausea and/or Vomiting  oxyCODONE    IR 5 milliGRAM(s) Oral every 4 hours PRN Moderate Pain (4 - 6)      Allergies    No Known Allergies    Intolerances        REVIEW OF SYSTEMS:  CONSTITUTIONAL: No fever or chills  HEENT:  No headache, no sore throat  RESPIRATORY: No cough, wheezing, or shortness of breath  CARDIOVASCULAR: No chest pain, palpitations, or leg swelling  GASTROINTESTINAL: Admits mild abd pain, nausea, vomiting, diarrhea  GENITOURINARY: No dysuria, frequency, or hematuria  NEUROLOGICAL: no focal weakness or dizziness  MUSCULOSKELETAL: no myalgias     Vital Signs Last 24 Hrs  T(C): 36.8 (2022 07:42), Max: 38.7 (2022 01:45)  T(F): 98.3 (2022 07:42), Max: 101.7 (2022 01:45)  HR: 74 (2022 07:42) (74 - 104)  BP: 100/62 (2022 07:42) (100/62 - 156/72)  BP(mean): --  RR: 16 (2022 07:42) (16 - 20)  SpO2: 96% (2022 07:42) (96% - 100%)    PHYSICAL EXAM:  GENERAL: NAD, appears older than stated age  HEENT:  NCAT, moist mucous membranes  CHEST/LUNG:  CTA b/l, no rales, wheezes, or rhonchi, breathing comfortably on room air  HEART:  RRR, S1, S2  ABDOMEN:  BS+, soft, nondistended, mildly tender to deep palpation   EXTREMITIES: no edema, cyanosis, or calf tenderness  NERVOUS SYSTEM: AA&Ox3, sensation intact, anxious    LABS:                        8.9    9.76  )-----------( 340      ( 2022 06:00 )             27.2     CBC Full  -  ( 2022 06:00 )  WBC Count : 9.76 K/uL  Hemoglobin : 8.9 g/dL  Hematocrit : 27.2 %  Platelet Count - Automated : 340 K/uL  Mean Cell Volume : 86.9 fl  Mean Cell Hemoglobin : 28.4 pg  Mean Cell Hemoglobin Concentration : 32.7 gm/dL  Auto Neutrophil # : 7.71 K/uL  Auto Lymphocyte # : 0.75 K/uL  Auto Monocyte # : 1.17 K/uL  Auto Eosinophil # : 0.03 K/uL  Auto Basophil # : 0.04 K/uL  Auto Neutrophil % : 79.0 %  Auto Lymphocyte % : 7.7 %  Auto Monocyte % : 12.0 %  Auto Eosinophil % : 0.3 %  Auto Basophil % : 0.4 %    2022 06:00    132    |  97     |  12     ----------------------------<  162    2.7     |  27     |  1.43     Ca    8.2        2022 06:00  Phos  3.0       2022 06:00  Mg     1.9       2022 06:00    TPro  5.7    /  Alb  2.0    /  TBili  0.4    /  DBili  x      /  AST  18     /  ALT  16     /  AlkPhos  57     2022 06:00    PT/INR - ( 2022 20:18 )   PT: 13.6 sec;   INR: 1.18 ratio         PTT - ( 2022 20:18 )  PTT:24.7 sec  Urinalysis Basic - ( 2022 02:38 )    Color: Yellow / Appearance: Clear / S.010 / pH: x  Gluc: x / Ketone: Negative  / Bili: Negative / Urobili: Negative mg/dL   Blood: x / Protein: 15 mg/dL / Nitrite: Negative   Leuk Esterase: Negative / RBC: x / WBC 0-2   Sq Epi: x / Non Sq Epi: Occasional / Bacteria: Occasional      CAPILLARY BLOOD GLUCOSE              RADIOLOGY & ADDITIONAL TESTS: < from: CT Abdomen and Pelvis No Cont (22 @ 16:25) >    ACC: [id number] EXAM:  CT ABDOMEN AND PELVIS                          PROCEDURE DATE:  2022          INTERPRETATION:  CLINICAL INFORMATION: Diarrhea and left lower quadrant   pain.    COMPARISON: None.    CONTRAST/COMPLICATIONS:  IV Contrast: NONE  Oral Contrast: NONE  Complications: None reported at time of study completion    PROCEDURE:  CT of the Abdomen and Pelvis was performed.  Sagittal and coronal reformats were performed.    FINDINGS:    LOWER CHEST: Few partially imaged clustered nodules of the right middle   lobe, images 1-4 of series 3. These findings may be infectious or   inflammatory. Correlate with chest CT. No visualized pleural effusion.    Solid organ evaluation limited due to noncontrast technique.    LIVER: Measures 17.5 cm craniocaudal dimension  BILE DUCTS: No intrahepatic ductal dilatation. Common bile duct is   enlarged for patient age, measuring approximately 1 cm. Correlate with   LFTs.  GALLBLADDER: Contracted  SPLEEN: Spleen size within normal limits  PANCREAS: No acute peripancreatic inflammation  ADRENALS: Unremarkable  KIDNEYS/URETERS: No hydronephrosis    BLADDER: Underdistended  REPRODUCTIVE ORGANS: Myomatous uterus, suboptimally characterized on CT.    BOWEL: Stomach is underdistended. No small bowel distention. The appendix   is nondistended and noninflamed. There is long segment moderate to severe   mural thickening of the left colon and sigmoid colon concerning for   colitis. Adjacent reactive adenopathy is noted. Colonic diverticulosis is   also noted without clear evidence of focal diverticulitis.  PERITONEUM: Trace fluid. No loculated fluid collection or free air.  VESSELS: No aneurysm of the abdominal aorta. Aortoiliac atheromatous   changes.  RETROPERITONEUM/LYMPH NODES: Prominent adenopathy along the course of the   inflamed colon is likely reactive in nature.  ABDOMINAL WALL: Tiny fat-containing umbilical hernia.  BONES: Degenerative changes.    IMPRESSION:    Limited noncontrast study.    Moderate to severe colitis of the left colon and sigmoid colon. Prominent   adjacent adenopathy is likely reactive in nature. No abscess or free air.   Follow to resolution with repeat CT abdomen pelvis in 10-14 days.   Consider colonoscopy once acute symptoms have resolved.    Few partially imaged clustered nodules of the right middle lobe, images   1-4 of series 3. These findings may be infectious or inflammatory.   Correlate with chest CT.    Common bile duct is enlarged for patient age, measuring approximately 1   cm. Correlate with LFTs.    --- End of Report ---            SHERON RODRIGUEZ M.D., ATTENDING RADIOLOGIST  This document has been electronically signed. 2022  4:45PM    < end of copied text >      Consultant(s) Notes Reviewed:  [x] YES  [ ] NO    Care Discussed with [x] Consultants  [x] Patient  [ ] Family  [ ]      [ x] Other; RN  DVT ppx  
Patient is a 67y old  Female who presents with a chief complaint of Abdominal pain & diarrhea. (24 Jun 2022 06:06)      INTERVAL HPI/OVERNIGHT EVENTS: Patient seen and examined at bedside. No overnight events. Feels better today, tolerating diet. Looking forward to going him.    MEDICATIONS  (STANDING):  Biotene Dry Mouth Oral Rinse 5 milliLiter(s) Swish and Spit four times a day  dextrose 5% + sodium chloride 0.9% with potassium chloride 20 mEq/L 1000 milliLiter(s) (75 mL/Hr) IV Continuous <Continuous>  heparin   Injectable 5000 Unit(s) SubCutaneous every 12 hours  piperacillin/tazobactam IVPB.. 3.375 Gram(s) IV Intermittent every 8 hours  potassium chloride    Tablet ER 40 milliEquivalent(s) Oral once    MEDICATIONS  (PRN):  acetaminophen     Tablet .. 650 milliGRAM(s) Oral every 6 hours PRN Mild Pain (1 - 3)  acetaminophen     Tablet .. 650 milliGRAM(s) Oral every 6 hours PRN Temp greater or equal to 38C (100.4F)  oxyCODONE    IR 5 milliGRAM(s) Oral every 4 hours PRN Moderate Pain (4 - 6)  oxyCODONE    IR 15 milliGRAM(s) Oral three times a day PRN Severe Pain (7 - 10)      Allergies    amoxicillin (Hives; Swelling)    Intolerances        REVIEW OF SYSTEMS:  CONSTITUTIONAL: No fever or chills  HEENT:  No headache, no sore throat  RESPIRATORY: No cough, wheezing, or shortness of breath  CARDIOVASCULAR: No chest pain, palpitations, or leg swelling  GASTROINTESTINAL: No abd pain, nausea, vomiting. Diarrhea improving  GENITOURINARY: No dysuria, frequency, or hematuria  NEUROLOGICAL: no focal weakness or dizziness  MUSCULOSKELETAL: no myalgias     Vital Signs Last 24 Hrs  T(C): 36.7 (24 Jun 2022 05:43), Max: 37.1 (23 Jun 2022 18:11)  T(F): 98 (24 Jun 2022 05:43), Max: 98.7 (23 Jun 2022 18:11)  HR: 87 (24 Jun 2022 05:43) (74 - 88)  BP: 126/64 (24 Jun 2022 05:43) (112/75 - 144/88)  BP(mean): --  RR: 18 (24 Jun 2022 05:43) (17 - 18)  SpO2: 98% (24 Jun 2022 05:43) (97% - 98%)    PHYSICAL EXAM:  GENERAL: NAD, appears older than stated age  HEENT:  NCAT, moist mucous membranes  CHEST/LUNG:  CTA b/l, no rales, wheezes, or rhonchi, breathing comfortably on room air  HEART:  RRR, S1, S2  ABDOMEN:  BS+, soft, nondistended, mildly tender to deep palpation   EXTREMITIES: no edema, cyanosis, or calf tenderness  NERVOUS SYSTEM: AA&Ox3, sensation intact    LABS:                        9.0    7.77  )-----------( 348      ( 24 Jun 2022 08:08 )             28.4     CBC Full  -  ( 24 Jun 2022 08:08 )  WBC Count : 7.77 K/uL  Hemoglobin : 9.0 g/dL  Hematocrit : 28.4 %  Platelet Count - Automated : 348 K/uL  Mean Cell Volume : 91.0 fl  Mean Cell Hemoglobin : 28.8 pg  Mean Cell Hemoglobin Concentration : 31.7 gm/dL  Auto Neutrophil # : x  Auto Lymphocyte # : x  Auto Monocyte # : x  Auto Eosinophil # : x  Auto Basophil # : x  Auto Neutrophil % : x  Auto Lymphocyte % : x  Auto Monocyte % : x  Auto Eosinophil % : x  Auto Basophil % : x    24 Jun 2022 08:08    141    |  107    |  7      ----------------------------<  138    3.4     |  24     |  0.97     Ca    8.4        24 Jun 2022 08:08          CAPILLARY BLOOD GLUCOSE              RADIOLOGY & ADDITIONAL TESTS: < from: CT Abdomen and Pelvis No Cont (06.21.22 @ 16:25) >  ACC: [id number] EXAM:  CT ABDOMEN AND PELVIS                          PROCEDURE DATE:  06/21/2022          INTERPRETATION:  CLINICAL INFORMATION: Diarrhea and left lower quadrant   pain.    COMPARISON: None.    CONTRAST/COMPLICATIONS:  IV Contrast: NONE  Oral Contrast: NONE  Complications: None reported at time of study completion    PROCEDURE:  CT of the Abdomen and Pelvis was performed.  Sagittal and coronal reformats were performed.    FINDINGS:    LOWER CHEST: Few partially imaged clustered nodules of the right middle   lobe, images 1-4 of series 3. These findings may be infectious or   inflammatory. Correlate with chest CT. No visualized pleural effusion.    Solid organ evaluation limited due to noncontrast technique.    LIVER: Measures 17.5 cm craniocaudal dimension  BILE DUCTS: No intrahepatic ductal dilatation. Common bile duct is   enlarged for patient age, measuring approximately 1 cm. Correlate with   LFTs.  GALLBLADDER: Contracted  SPLEEN: Spleen size within normal limits  PANCREAS: No acute peripancreatic inflammation  ADRENALS: Unremarkable  KIDNEYS/URETERS: No hydronephrosis    BLADDER: Underdistended  REPRODUCTIVE ORGANS: Myomatous uterus, suboptimally characterized on CT.    BOWEL: Stomach is underdistended. No small bowel distention. The appendix   is nondistended and noninflamed. There is long segment moderate to severe   mural thickening of the left colon and sigmoid colon concerning for   colitis. Adjacent reactive adenopathy is noted. Colonic diverticulosis is   also noted without clear evidence of focal diverticulitis.  PERITONEUM: Trace fluid. No loculated fluid collection or free air.  VESSELS: No aneurysm of the abdominal aorta. Aortoiliac atheromatous   changes.  RETROPERITONEUM/LYMPH NODES: Prominent adenopathy along the course of the   inflamed colon is likely reactive in nature.  ABDOMINAL WALL: Tiny fat-containing umbilical hernia.  BONES: Degenerative changes.    IMPRESSION:    Limited noncontrast study.    Moderate to severe colitis of the left colon and sigmoid colon. Prominent   adjacent adenopathy is likely reactive in nature. No abscess or free air.   Follow to resolution with repeat CT abdomen pelvis in 10-14 days.   Consider colonoscopy once acute symptoms have resolved.    Few partially imaged clustered nodules of the right middle lobe, images   1-4 of series 3. These findings may be infectious or inflammatory.   Correlate with chest CT.    Common bile duct is enlarged for patient age, measuring approximately 1   cm. Correlate with LFTs.    --- End of Report ---            SHERON RODRIGUEZ M.D., ATTENDING RADIOLOGIST  This document has been electronically signed. Jun 21 2022  4:45PM    < end of copied text >      Consultant(s) Notes Reviewed:  [x] YES  [ ] NO    Care Discussed with [x] Consultants  [x] Patient  [ ] Family  [ ]      [ x] Other; RN  DVT ppx

## 2022-06-24 NOTE — DISCHARGE NOTE NURSING/CASE MANAGEMENT/SOCIAL WORK - NSDCPEFALRISK_GEN_ALL_CORE
For information on Fall & Injury Prevention, visit: [email or web address] OR  [email or web address] OR  https://www.cdc.gov/steadi/patient.html

## 2022-06-24 NOTE — PROGRESS NOTE ADULT - PROBLEM SELECTOR PLAN 3
Potassium supplemented  Repeat today at 4pm
Potassium supplemented  Levels improved today
Potassium supplemented  Levels improved today

## 2022-06-24 NOTE — DISCHARGE NOTE PROVIDER - NSDCFUADDINST_GEN_ALL_CORE_FT
- Please make an appointment for follow up with your primary doctor within 1-3 days of discharge  - It is important to see your primary physician as well as the physicians listed below to perform a comprehensive medical review.  Call their offices for an appointment as soon as you leave the hospital.  You will also need to see them for renewal of your medications.  If you do not have a primary physician, contact the [facility] Physician Referral Service at [phone].  To obtain your results, you can access the [facility] Patient Portal at [email or web address]   - Your medical issues appear to be stable at this time, but if your symptoms recur or worsen, contact your physicians and/or return to the hospital if necessary.    -If you encounter any issues or questions with your medication, call your physicians before stopping the medication.    - Limit your diet to 2 grams of sodium daily.  - Patient or caretaker is responsible for making all appointments

## 2022-06-24 NOTE — PROGRESS NOTE ADULT - PROBLEM SELECTOR PLAN 6
not on any medications for that, stated she tried 2 different statins & both gave her a bad reaction including skin rash, edema, and arthralgias, not willing to try any other medications even from other groups, she understands & agreed that she have an abnormal EKG & needs to see a cardiologist, and he can also take care of her cholesterol.
No hx of anemia but has never had colonoscopy  Will need outpatient follow up with GI  Check B12, Folate, iron studies
No hx of anemia but has never had colonoscopy  Will need outpatient follow up with GI  Workup reflective of anemia of chronic disease

## 2022-06-24 NOTE — PROGRESS NOTE ADULT - PROBLEM SELECTOR PLAN 4
pulm nodule - / cluster described in RML on CT abd   Pulm input appreciated, ordered for dedicated CT chest but refused  Discussed again with patient again but refused. Will need outpatient follow up
pulm nodule - / cluster described in RML on CT abd   Pulm input appreciated, ordered for dedicated CT chest but refused  Will discuss with patient again
pulm nodule - / cluster described in RML on CT abd   Pulm input appreciated, ordered for dedicated CT chest but refused  Discussed again with patient again but refused. Will need outpatient follow up

## 2022-06-25 LAB
CULTURE RESULTS: SIGNIFICANT CHANGE UP
CULTURE RESULTS: SIGNIFICANT CHANGE UP
SPECIMEN SOURCE: SIGNIFICANT CHANGE UP
SPECIMEN SOURCE: SIGNIFICANT CHANGE UP

## 2023-02-23 NOTE — PATIENT PROFILE ADULT - NSPRESCRALCSCORE_GEN_A_NUR_CAL
Called patient to review EMG results as stated below. Patient is scheduled for upcoming ELIAZAR with Dr. Rios. Will communicate EMG results with Dr. Rios as well. Advised patient to call clinic if symptoms persist after ELIAZAR. Patient voiced agreement with plan.     EMG 2/20/23   Interpretation:  This is an abnormal study. There is electrophysiologic evidence of a chronic right-sided L5 radiculopathy. Limited needle EMG of left-sided muscles was suggestive of a similar but less severe process affecting the left side as well (e.g., chronic bilateral L5 radiculopathies). Clinical correlation is recommended.       Megan Waterman [facility]  [address] [address]  Tel [phone]  Pager [phone]       3